# Patient Record
Sex: FEMALE | Race: WHITE | NOT HISPANIC OR LATINO | Employment: FULL TIME | URBAN - METROPOLITAN AREA
[De-identification: names, ages, dates, MRNs, and addresses within clinical notes are randomized per-mention and may not be internally consistent; named-entity substitution may affect disease eponyms.]

---

## 2019-01-24 ENCOUNTER — OFFICE VISIT (OUTPATIENT)
Dept: NEUROSURGERY | Facility: CLINIC | Age: 44
End: 2019-01-24

## 2019-01-24 VITALS
DIASTOLIC BLOOD PRESSURE: 82 MMHG | SYSTOLIC BLOOD PRESSURE: 160 MMHG | WEIGHT: 184.6 LBS | HEIGHT: 69 IN | TEMPERATURE: 98.2 F | BODY MASS INDEX: 27.34 KG/M2

## 2019-01-24 DIAGNOSIS — M50.30 DEGENERATIVE DISC DISEASE, CERVICAL: Primary | ICD-10-CM

## 2019-01-24 PROCEDURE — 99243 OFF/OP CNSLTJ NEW/EST LOW 30: CPT | Performed by: NEUROLOGICAL SURGERY

## 2019-01-24 RX ORDER — NABUMETONE 750 MG/1
750 TABLET, FILM COATED ORAL 2 TIMES DAILY
Qty: 60 TABLET | Refills: 0 | Status: SHIPPED | OUTPATIENT
Start: 2019-01-24 | End: 2019-01-24

## 2019-01-24 RX ORDER — CYCLOBENZAPRINE HCL 10 MG
10 TABLET ORAL 3 TIMES DAILY PRN
COMMUNITY
End: 2020-02-04

## 2019-01-24 RX ORDER — NABUMETONE 750 MG/1
750 TABLET, FILM COATED ORAL 2 TIMES DAILY
Qty: 60 TABLET | Refills: 0 | Status: SHIPPED | OUTPATIENT
Start: 2019-01-24 | End: 2020-02-04

## 2019-01-24 RX ORDER — IBUPROFEN 800 MG/1
800 TABLET ORAL EVERY 6 HOURS PRN
COMMUNITY
End: 2020-08-14 | Stop reason: HOSPADM

## 2019-01-24 NOTE — PROGRESS NOTES
Yuni Cowan  1975  [id number]      Chief Complaint   Patient presents with   • Neck Pain   • Arm Pain       HISTORY OF PRESENT ILLNESS:  This is a 43-year-old seen with a several month progressive history of pain in the cervical area rating into the right shoulder and right upper extremity.  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  She has been to physical therapy with only minimal improvement.  She is been on ibuprofen which has helped somewhat; meloxicam has not.  Cervical MRIs been performed and she referred for neurosurgical consultation.    She has a genetic predisposition for degenerative disc disease.     Past Medical History:   Diagnosis Date   • Anemia    • Headache        Past Surgical History:   Procedure Laterality Date   • TONSILLECTOMY     • TUBAL ABDOMINAL LIGATION     • WISDOM TOOTH EXTRACTION         Family History   Problem Relation Age of Onset   • Hypertension Father    • Diabetes Son    • Kidney disease Maternal Grandfather        Social History     Socioeconomic History   • Marital status:      Spouse name: Not on file   • Number of children: Not on file   • Years of education: Not on file   • Highest education level: Not on file   Social Needs   • Financial resource strain: Not on file   • Food insecurity - worry: Not on file   • Food insecurity - inability: Not on file   • Transportation needs - medical: Not on file   • Transportation needs - non-medical: Not on file   Occupational History   • Not on file   Tobacco Use   • Smoking status: Never Smoker   • Smokeless tobacco: Never Used   Substance and Sexual Activity   • Alcohol use: No     Frequency: Never   • Drug use: No   • Sexual activity: Defer   Other Topics Concern   • Not on file   Social History Narrative   • Not on file       No Known Allergies      Current Outpatient Medications:   •  cyclobenzaprine (FLEXERIL) 10 MG tablet, Take 10 mg by mouth 3 (Three) Times a Day As Needed for Muscle  Spasms., Disp: , Rfl:   •  ibuprofen (ADVIL,MOTRIN) 800 MG tablet, Take 800 mg by mouth Every 6 (Six) Hours As Needed for Mild Pain ., Disp: , Rfl:     Review of Systems   Constitutional: Positive for activity change and fatigue. Negative for appetite change, chills, diaphoresis, fever and unexpected weight change.   HENT: Negative for congestion, dental problem, drooling, ear discharge, ear pain, facial swelling, hearing loss, mouth sores, nosebleeds, postnasal drip, rhinorrhea, sinus pressure, sneezing, sore throat, tinnitus, trouble swallowing and voice change.    Eyes: Negative for photophobia, pain, discharge, redness, itching and visual disturbance.   Respiratory: Positive for apnea and chest tightness. Negative for cough, choking, shortness of breath, wheezing and stridor.    Cardiovascular: Positive for chest pain. Negative for palpitations and leg swelling.   Gastrointestinal: Negative for abdominal distention, abdominal pain, anal bleeding, blood in stool, constipation, diarrhea, nausea, rectal pain and vomiting.   Endocrine: Negative for cold intolerance, heat intolerance, polydipsia, polyphagia and polyuria.   Genitourinary: Positive for frequency and pelvic pain. Negative for decreased urine volume, difficulty urinating, dysuria, enuresis, flank pain, genital sores, hematuria and urgency.   Musculoskeletal: Positive for arthralgias, back pain, myalgias, neck pain and neck stiffness. Negative for gait problem and joint swelling.   Skin: Negative for color change, pallor, rash and wound.   Allergic/Immunologic: Negative for environmental allergies, food allergies and immunocompromised state.   Neurological: Positive for weakness, light-headedness, numbness and headaches. Negative for dizziness, tremors, seizures, syncope, facial asymmetry and speech difficulty.   Hematological: Negative for adenopathy. Bruises/bleeds easily.   Psychiatric/Behavioral: Positive for agitation, confusion, decreased  concentration, dysphoric mood and sleep disturbance. Negative for behavioral problems, hallucinations, self-injury and suicidal ideas. The patient is nervous/anxious. The patient is not hyperactive.        There were no vitals filed for this visit.    Neurological Examination:    Mental status/speech: The patient is alert and oriented.  Speech is clear without aphysia or dysarthria.  No overt cognitive deficits.    Cranial nerve examination:    Olfaction: Smell is intact.  Vision: Vision is intact without visual field abnormalities.  Funduscopic examination is normal.  No pupillary irregularity.  Ocular motor examination: The extraocular muscles are intact.  There is no diplopia.  The pupil is round and reactive to both light and accommodation.  There is no nystagmus.  Facial movement/sensation: There is no facial weakness.  Sensation is intact in the first, second, and third divisions of the trigeminal nerve.  The corneal reflex is intact.  Auditory: Hearing is intact to finger rub bilaterally.  Cranial nerves IX, X, XI, XII: Phonation is normal.  No dysphagia.  Tongue is protruded in the midline without atrophy.  The gag reflex is intact.  Shoulder shrug is normal.    Musculoligamentous ligamentous examination: Tatian range of motion of her neck toward the right.  She had generalized weakness in her right upper extremity at the deep tendon reflexes are elicitable.  There is no Babinski Marva or clonus.  Her gait is normal.    Medical Decision Making:     Diagnostic Data Set:  The cervical MRI showed degenerative disc disease throughout the cervical spine.  At C5-C6 and C6-C7 she has bulge of intravertebral disc.      Assessment:  Symptomatic cervical spondylosis          Recommendations:  I've send her for a ergonomic evaluation by physical therapy; have given her prescription for Relafen 750 mg twice a day we'll ask her to call me in 2 weeks.  I'm hopeful that with correction of her workstation and perhaps  cervical traction she will show some improvement.  If not she would need additional diagnostic studies to include EMG/NCV and possible myelography.  I would hope, however, we can manage this conservatively.  I will continue to follow her and keep you informed.         I greatly appreciate the opportunity to see and evaluate this individual.  If you have questions or concerns regarding issues that I may have overlooked please call me at any time: [phone].  Alcides Van M.D.  [facility]  [address] [place] [address]    Scribed for Oumar Van MD by Lakeshia Garduno CMA. 1/24/2019  9:28 AM     I have read and concur with the information provided by the scribe.  Oumar Van MD

## 2019-01-24 NOTE — PATIENT INSTRUCTIONS
Call Dr. Van on a Monday or Tuesday with an update.    Ask for Barbara () and leave a message for  Dr. Van.   He will call you back at the end of the day as soon as he can.       Call 2 weeks      [phone]

## 2019-09-23 ENCOUNTER — OFFICE VISIT (OUTPATIENT)
Dept: NEUROSURGERY | Facility: CLINIC | Age: 44
End: 2019-09-23

## 2019-09-23 VITALS — HEIGHT: 69 IN | BODY MASS INDEX: 27.11 KG/M2 | TEMPERATURE: 97.6 F | WEIGHT: 183 LBS

## 2019-09-23 DIAGNOSIS — M50.30 DEGENERATIVE DISC DISEASE, CERVICAL: ICD-10-CM

## 2019-09-23 DIAGNOSIS — R29.898 RIGHT HAND WEAKNESS: ICD-10-CM

## 2019-09-23 DIAGNOSIS — R29.898 RIGHT ARM WEAKNESS: ICD-10-CM

## 2019-09-23 DIAGNOSIS — M50.20 HNP (HERNIATED NUCLEUS PULPOSUS), CERVICAL: Primary | ICD-10-CM

## 2019-09-23 PROBLEM — M54.12 RADICULOPATHY OF CERVICAL SPINE: Status: ACTIVE | Noted: 2019-09-23

## 2019-09-23 PROCEDURE — 99215 OFFICE O/P EST HI 40 MIN: CPT | Performed by: PHYSICIAN ASSISTANT

## 2019-10-07 DIAGNOSIS — M50.20 HNP (HERNIATED NUCLEUS PULPOSUS), CERVICAL: ICD-10-CM

## 2019-10-07 DIAGNOSIS — R29.898 RIGHT ARM WEAKNESS: ICD-10-CM

## 2019-10-07 DIAGNOSIS — M50.30 DEGENERATIVE DISC DISEASE, CERVICAL: Primary | ICD-10-CM

## 2019-10-08 PROBLEM — M50.30 DEGENERATIVE DISC DISEASE, CERVICAL: Status: ACTIVE | Noted: 2019-10-08

## 2019-10-09 ENCOUNTER — HOSPITAL ENCOUNTER (OUTPATIENT)
Facility: HOSPITAL | Age: 44
Setting detail: HOSPITAL OUTPATIENT SURGERY
Discharge: HOME | End: 2019-10-09
Attending: RADIOLOGY | Admitting: NEUROLOGICAL SURGERY

## 2019-10-09 ENCOUNTER — HOSPITAL ENCOUNTER (OUTPATIENT)
Dept: GENERAL RADIOLOGY | Facility: HOSPITAL | Age: 44
End: 2019-10-09

## 2019-10-09 ENCOUNTER — APPOINTMENT (OUTPATIENT)
Dept: CT IMAGING | Facility: HOSPITAL | Age: 44
End: 2019-10-09

## 2019-10-09 ENCOUNTER — HOSPITAL ENCOUNTER (OUTPATIENT)
Dept: NEUROLOGY | Facility: HOSPITAL | Age: 44
Discharge: HOME | End: 2019-10-09

## 2019-10-09 ENCOUNTER — APPOINTMENT (OUTPATIENT)
Dept: NEUROLOGY | Facility: HOSPITAL | Age: 44
End: 2019-10-09

## 2019-10-09 VITALS
WEIGHT: 186.73 LBS | TEMPERATURE: 98.5 F | OXYGEN SATURATION: 99 % | DIASTOLIC BLOOD PRESSURE: 88 MMHG | RESPIRATION RATE: 20 BRPM | HEIGHT: 69 IN | SYSTOLIC BLOOD PRESSURE: 125 MMHG | HEART RATE: 66 BPM | BODY MASS INDEX: 27.66 KG/M2

## 2019-10-09 DIAGNOSIS — M50.30 DEGENERATIVE DISC DISEASE, CERVICAL: ICD-10-CM

## 2019-10-09 DIAGNOSIS — M50.20 HNP (HERNIATED NUCLEUS PULPOSUS), CERVICAL: ICD-10-CM

## 2019-10-09 DIAGNOSIS — R29.898 RIGHT ARM WEAKNESS: ICD-10-CM

## 2019-10-09 LAB — B-HCG UR QL: NEGATIVE

## 2019-10-09 PROCEDURE — 61055 INJECTION INTO BRAIN CANAL: CPT | Performed by: RADIOLOGY

## 2019-10-09 PROCEDURE — 95886 MUSC TEST DONE W/N TEST COMP: CPT

## 2019-10-09 PROCEDURE — 72240 MYELOGRAPHY NECK SPINE: CPT | Performed by: RADIOLOGY

## 2019-10-09 PROCEDURE — 95908 NRV CNDJ TST 3-4 STUDIES: CPT

## 2019-10-09 PROCEDURE — 25010000002 IOPAMIDOL 61 % SOLUTION: Performed by: RADIOLOGY

## 2019-10-09 PROCEDURE — 81025 URINE PREGNANCY TEST: CPT | Performed by: RADIOLOGY

## 2019-10-09 PROCEDURE — 72126 CT NECK SPINE W/DYE: CPT

## 2019-10-09 RX ORDER — LIDOCAINE HYDROCHLORIDE 10 MG/ML
INJECTION, SOLUTION EPIDURAL; INFILTRATION; INTRACAUDAL; PERINEURAL AS NEEDED
Status: DISCONTINUED | OUTPATIENT
Start: 2019-10-09 | End: 2019-10-09 | Stop reason: HOSPADM

## 2019-10-09 NOTE — DISCHARGE INSTRUCTIONS
1.) DRINK PLENTY OF FLUIDS ( WATER) TODAY.  2.) NO STRENUOUS ACTIVITY,  DO NOT LIFT ANYTHING OVER 20 LBS FOR 24 HOURS.

## 2019-10-17 ENCOUNTER — OFFICE VISIT (OUTPATIENT)
Dept: NEUROSURGERY | Facility: CLINIC | Age: 44
End: 2019-10-17

## 2019-10-17 VITALS
WEIGHT: 187 LBS | TEMPERATURE: 97 F | SYSTOLIC BLOOD PRESSURE: 170 MMHG | OXYGEN SATURATION: 98 % | HEART RATE: 66 BPM | HEIGHT: 69 IN | BODY MASS INDEX: 27.7 KG/M2 | RESPIRATION RATE: 20 BRPM | DIASTOLIC BLOOD PRESSURE: 95 MMHG

## 2019-10-17 DIAGNOSIS — M50.20 HNP (HERNIATED NUCLEUS PULPOSUS), CERVICAL: ICD-10-CM

## 2019-10-17 DIAGNOSIS — R29.898 RIGHT ARM WEAKNESS: ICD-10-CM

## 2019-10-17 DIAGNOSIS — M50.30 DEGENERATIVE DISC DISEASE, CERVICAL: Primary | ICD-10-CM

## 2019-10-17 PROCEDURE — 99213 OFFICE O/P EST LOW 20 MIN: CPT | Performed by: NEUROLOGICAL SURGERY

## 2019-10-17 NOTE — PATIENT INSTRUCTIONS
Call Dr. Van on a Monday or Tuesday with an update.      Ask for Barbara () and leave a message for  Dr. Van.     He will call you back at the end of the day as soon as he can.     [phone]

## 2019-10-17 NOTE — PROGRESS NOTES
"Yuni Cowan  1975  [id number]                        CHIEF COMPLAINT: Right shoulder pain         MEDICAL HISTORY SINCE LAST ENCOUNTER: This 44-year-old had a cervical myelogram showing the presence of cervical spondylosis at C5-C6 greater on the right than the left.  Initially she has severe pain rating to right upper extremity however with therapy and ergonomic arrangement of office is better although not asymptomatic.  She still has pain in her right shoulder that radiates suboccipital to the temporal region.  She on occasion \"drops things\".  Nevertheless she does not have the same radicular symptoms she had previously.  Therefore although she has shown some improvement she remains minimally symptomatic.           Past Medical History:   Diagnosis Date   • Anemia    • Headache               Past Surgical History:   Procedure Laterality Date   • [place] MYELOGRAPHY VIA LUMBAR INJECTION RS&I CERVICAL N/A 10/9/2019    Procedure: IR myelogram, cervical;  Surgeon: Jose Amado MD;  Location: [facility] INVASIVE LOCATION;  Service: Interventional Radiology   • TONSILLECTOMY     • TUBAL ABDOMINAL LIGATION     • WISDOM TOOTH EXTRACTION                Family History   Problem Relation Age of Onset   • Hypertension Father    • Diabetes Son    • Kidney disease Maternal Grandfather               Social History     Socioeconomic History   • Marital status:      Spouse name: Not on file   • Number of children: Not on file   • Years of education: Not on file   • Highest education level: Not on file   Tobacco Use   • Smoking status: Never Smoker   • Smokeless tobacco: Never Used   Substance and Sexual Activity   • Alcohol use: No     Frequency: Never   • Drug use: No   • Sexual activity: Defer            No Known Allergies           Current Outpatient Medications:   •  cyclobenzaprine (FLEXERIL) 10 MG tablet, Take 10 mg by mouth 3 (Three) Times a Day As Needed for Muscle Spasms., Disp: , Rfl:   •  ibuprofen " "(ADVIL,MOTRIN) 800 MG tablet, Take 800 mg by mouth Every 6 (Six) Hours As Needed for Mild Pain ., Disp: , Rfl:   •  nabumetone (RELAFEN) 750 MG tablet, Take 1 tablet by mouth 2 (Two) Times a Day., Disp: 60 tablet, Rfl: 0         Review of Systems   Musculoskeletal: Positive for arthralgias, myalgias, neck pain and neck stiffness.   Neurological: Positive for weakness, numbness and headaches.   All other systems reviewed and are negative.              Vitals:    10/17/19 1448   BP: 170/95   Pulse: 66   Resp: 20   Temp: 97 °F (36.1 °C)   SpO2: 98%   Weight: 84.8 kg (187 lb)   Height: 175.3 cm (69\")               EXAMINATION: Slight decreased range of motion of the cervical spine.  There is no weakness sensory loss or reflex asymmetry at this time.            MEDICAL DECISION MAKING: She has improved to the point that I am not certain that anterior cervical decompression with fusion at C5-C6 is going to alleviate the symptoms that she currently manifests.           ASSESSMENT/DISPOSITION: I have asked her to consider pain management in reference to a cortisone injection either in the shoulder or facet block at C5-C6.  She will call me afterward.  My concern is the outcome of any planned surgery for disc excision with the symptoms that she has at this time.  The symptoms clearly are indicative of degenerative change however are nonradicular.              I APPRECIATE THE OPPORTUNITY OF THIS REFERRAL. PLEASE CALL IF ANY       QUESTIONS [phone]  Scribed for Oumar Van MD by Marla Jones CMA. 10/17/2019  3:00 PM  I have read and concur with the information provided by the scribe.  Oumar Van MD    "

## 2020-01-30 ENCOUNTER — PREP FOR SURGERY (OUTPATIENT)
Dept: OTHER | Facility: HOSPITAL | Age: 45
End: 2020-01-30

## 2020-01-30 DIAGNOSIS — M54.12 CERVICAL RADICULITIS: Primary | ICD-10-CM

## 2020-01-30 NOTE — H&P
[id number]  Yuni Cowan  female  1975  Aman Chatterjee, DO  1/30/2020  PATIENT NAME: Yuni Cowan      BP: ()/()   Arterial Line BP: ()/()     Past Medical History:   Diagnosis Date   • Anemia    • Headache        Past Surgical History:   Procedure Laterality Date   • [place] MYELOGRAPHY VIA LUMBAR INJECTION RS&I CERVICAL N/A 10/9/2019    Procedure: IR myelogram, cervical;  Surgeon: Jose Amado MD;  Location: [facility] INVASIVE LOCATION;  Service: Interventional Radiology   • TONSILLECTOMY     • TUBAL ABDOMINAL LIGATION     • WISDOM TOOTH EXTRACTION         Social History     Socioeconomic History   • Marital status:      Spouse name: Not on file   • Number of children: Not on file   • Years of education: Not on file   • Highest education level: Not on file   Tobacco Use   • Smoking status: Never Smoker   • Smokeless tobacco: Never Used   Substance and Sexual Activity   • Alcohol use: No     Frequency: Never   • Drug use: No   • Sexual activity: Defer       Family History   Problem Relation Age of Onset   • Hypertension Father    • Diabetes Son    • Kidney disease Maternal Grandfather          (Not in a hospital admission)      Review of Systems/Physical Exam:   Within Normal Limits Abnormal   Mental Status [x]    []     Head, Neck, and Throat [x]   []     Chest/Lungs [x]   []     Cardiovascular [x]   []     Abdomen [x]   []     Extremities [x]   []     Neurologic [x]   []     Other         There were no vitals filed for this visit.    Review of Systems - as previously noted      Diagnosis: M54.12    Plan: CERVICAL EPIDURAL (N/A)       STATEMENT AS TO REASON OF PLANNED OPERATION:  [x]   H&P revealed no contraindication to planned surgery. (Check if correct).    [x]   Labs (if ordered) have been reviewed and revealed no contraindications to planned surgery. (Check if correct).      Aman Chatterjee, DO  1/30/2020

## 2020-02-05 ENCOUNTER — HOSPITAL ENCOUNTER (OUTPATIENT)
Facility: HOSPITAL | Age: 45
Setting detail: HOSPITAL OUTPATIENT SURGERY
Discharge: HOME | End: 2020-02-05
Attending: ANESTHESIOLOGY | Admitting: ANESTHESIOLOGY

## 2020-02-05 ENCOUNTER — APPOINTMENT (OUTPATIENT)
Dept: GENERAL RADIOLOGY | Facility: HOSPITAL | Age: 45
End: 2020-02-05

## 2020-02-05 VITALS
SYSTOLIC BLOOD PRESSURE: 155 MMHG | HEIGHT: 69 IN | TEMPERATURE: 98.2 F | RESPIRATION RATE: 20 BRPM | BODY MASS INDEX: 27.4 KG/M2 | HEART RATE: 66 BPM | OXYGEN SATURATION: 100 % | DIASTOLIC BLOOD PRESSURE: 84 MMHG | WEIGHT: 185 LBS

## 2020-02-05 LAB
B-HCG UR QL: NEGATIVE
INTERNAL NEGATIVE CONTROL: NEGATIVE
INTERNAL POSITIVE CONTROL: POSITIVE
Lab: NORMAL

## 2020-02-05 PROCEDURE — 0 IOPAMIDOL 41 % SOLUTION: Performed by: ANESTHESIOLOGY

## 2020-02-05 PROCEDURE — 25010000002 DEXAMETHASONE PER 1 MG: Performed by: ANESTHESIOLOGY

## 2020-02-05 PROCEDURE — 81025 URINE PREGNANCY TEST: CPT | Performed by: ANESTHESIOLOGY

## 2020-02-05 PROCEDURE — 76000 FLUOROSCOPY <1 HR PHYS/QHP: CPT | Performed by: RADIOLOGY

## 2020-02-05 PROCEDURE — 76000 FLUOROSCOPY <1 HR PHYS/QHP: CPT

## 2020-02-05 RX ORDER — LIDOCAINE HYDROCHLORIDE 20 MG/ML
INJECTION, SOLUTION INFILTRATION; PERINEURAL AS NEEDED
Status: DISCONTINUED | OUTPATIENT
Start: 2020-02-05 | End: 2020-02-05 | Stop reason: HOSPADM

## 2020-02-05 RX ORDER — DEXAMETHASONE SODIUM PHOSPHATE 10 MG/ML
INJECTION INTRAMUSCULAR; INTRAVENOUS AS NEEDED
Status: DISCONTINUED | OUTPATIENT
Start: 2020-02-05 | End: 2020-02-05 | Stop reason: HOSPADM

## 2020-02-05 NOTE — OP NOTE
Informed consent was obtained after a discussion of risks(hemodynamic instability, respiratory compromise, nerve damage, etc.) and benefits of the procedure, as well as the treatment alternatives, including continued conservative therapy.    The patient was brought into the fluoroscopy suite and positioned in the prone position on the procedure table with the head supported by a pillow.  Blood pressure, heart rate, and pulse oximetry were monitored throughout the case. Skin overlying the cervical region was cleansed with both ethyl alcohol and chlorahexidine solution. The area was then draped with sterile towel. Skin overlying the area was then infiltrated with 3cc of 1% Lidocaine.  Following this a 18 gauge Touhy needle was advanced into the C6/7 intralaminar space.  The epidural space was identified using a loss of resistance technique. Oblique view was also done to visualize needle depth. Aspirations were negative for blood or CSF, and no parasthesia were elicited.    Contrast was then injected with appropriate epidural spread.  Total volume of 2 mL (Omnipaque 240 mg/mL).   Next, an epidural steroid injection was performed with 15 mg of dexamethasone (10mg/mL) diluted in 2mL of normal saline for a total volume of 3.5mL. The needle was removed.    The patient was monitored for any adverse hemodynamic, allergic, or neurological symptoms. The patient tolerated the procedure well with no apparent complications. Vital signs remained stable throughout the procedure. The patient was taken to the recovery area where written discharge instructions for the procedure were given. The patient was discharged home.    NOTE: Universal Sterile Protocol was observed throughout the entire procedure.    MINDI 6/7    Cpt 36067    Aman Chatterjee DO  02/05/20

## 2020-05-26 ENCOUNTER — TELEPHONE (OUTPATIENT)
Dept: NEUROSURGERY | Facility: CLINIC | Age: 45
End: 2020-05-26

## 2020-05-26 NOTE — TELEPHONE ENCOUNTER
Pt left a mess lisset stating she has completed 2 steroid injections. The 2nd one has caused her more issues and pain than she had prior. Pt c/o of pain in her entire right arm, and is now having issues with hand movement. Pt wants to know if she should come back and see Dr. Van or continue with injections?

## 2020-06-01 ENCOUNTER — OFFICE VISIT (OUTPATIENT)
Dept: NEUROSURGERY | Facility: CLINIC | Age: 45
End: 2020-06-01

## 2020-06-01 VITALS — HEIGHT: 69 IN | WEIGHT: 189 LBS | BODY MASS INDEX: 27.99 KG/M2

## 2020-06-01 DIAGNOSIS — R29.898 RIGHT ARM WEAKNESS: Primary | ICD-10-CM

## 2020-06-01 DIAGNOSIS — M50.30 DEGENERATIVE DISC DISEASE, CERVICAL: ICD-10-CM

## 2020-06-01 PROCEDURE — 99213 OFFICE O/P EST LOW 20 MIN: CPT | Performed by: NEUROLOGICAL SURGERY

## 2020-06-01 NOTE — PROGRESS NOTES
Yuni Cowan  1975  [id number]                        CHIEF COMPLAINT:  [Cervical, right arm pain]         MEDICAL HISTORY SINCE LAST ENCOUNTER:  [This is a 44-year-old female who has a 3-year history of pain in the cervical area which initially radiated into the left upper extremity.  Diagnostic studies showed the presence of cervical spondylosis C5-6 and C6-7.  This was treated conservatively with physical therapy and pain management.  Steroid injection actually was quite helpful in alleviating the symptoms in her left upper extremity however she continues to have severe pain in her neck, intrascapular region rating into the right upper extremity.    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  However her workstation is ergonomically correct.  She has no lumbar issues.]           Past Medical History:   Diagnosis Date   • Anemia    • Arthritis    • Headache               Past Surgical History:   Procedure Laterality Date   • CERVICAL EPIDURAL N/A 2/5/2020    Procedure: CERVICAL EPIDURAL;  Surgeon: Aman Chatterjee DO;  Location: [facility];  Service: Pain Management;  Laterality: N/A;   • ENDOSCOPY     • [place] MYELOGRAPHY VIA LUMBAR INJECTION RS&I CERVICAL N/A 10/9/2019    Procedure: IR myelogram, cervical;  Surgeon: Jose Amado MD;  Location: [facility] INVASIVE LOCATION;  Service: Interventional Radiology   • TONSILLECTOMY     • TUBAL ABDOMINAL LIGATION     • WISDOM TOOTH EXTRACTION                Family History   Problem Relation Age of Onset   • Hypertension Father    • Diabetes Son    • Kidney disease Maternal Grandfather               Social History     Socioeconomic History   • Marital status:      Spouse name: Not on file   • Number of children: Not on file   • Years of education: Not on file   • Highest education level: Not on file   Tobacco Use   • Smoking status: Never Smoker   • Smokeless tobacco: Never Used   Substance  and Sexual Activity   • Alcohol use: No     Frequency: Never   • Drug use: No   • Sexual activity: Defer            No Known Allergies           Current Outpatient Medications:   •  ibuprofen (ADVIL,MOTRIN) 800 MG tablet, Take 800 mg by mouth Every 6 (Six) Hours As Needed for Mild Pain ., Disp: , Rfl:          Review of Systems   Constitutional: Positive for activity change, chills and fatigue. Negative for appetite change, diaphoresis, fever and unexpected weight change.   HENT: Positive for tinnitus. Negative for congestion, dental problem, drooling, ear discharge, ear pain, facial swelling, hearing loss, mouth sores, nosebleeds, postnasal drip, rhinorrhea, sinus pressure, sinus pain, sneezing, sore throat, trouble swallowing and voice change.    Eyes: Positive for photophobia. Negative for pain, discharge, redness, itching and visual disturbance.   Respiratory: Positive for chest tightness and shortness of breath. Negative for apnea, cough, choking, wheezing and stridor.    Cardiovascular: Positive for chest pain. Negative for palpitations and leg swelling.   Gastrointestinal: Negative for abdominal distention, abdominal pain, anal bleeding, blood in stool, constipation, diarrhea, nausea, rectal pain and vomiting.   Endocrine: Negative for cold intolerance, heat intolerance, polydipsia, polyphagia and polyuria.   Genitourinary: Negative for decreased urine volume, difficulty urinating, dyspareunia, dysuria, enuresis, flank pain, frequency, genital sores, hematuria, menstrual problem, pelvic pain, urgency, vaginal bleeding, vaginal discharge and vaginal pain.   Musculoskeletal: Positive for arthralgias, myalgias, neck pain and neck stiffness. Negative for back pain, gait problem and joint swelling.   Skin: Negative for color change, pallor, rash and wound.   Allergic/Immunologic: Negative for environmental allergies, food allergies and immunocompromised state.   Neurological: Positive for weakness,  "light-headedness, numbness and headaches. Negative for dizziness, tremors, seizures, syncope, facial asymmetry and speech difficulty.   Hematological: Negative for adenopathy. Does not bruise/bleed easily.   Psychiatric/Behavioral: Positive for agitation and sleep disturbance. Negative for behavioral problems, confusion, decreased concentration, dysphoric mood, hallucinations, self-injury and suicidal ideas. The patient is not nervous/anxious and is not hyperactive.                Vitals:    06/01/20 1413   Weight: 85.7 kg (189 lb)   Height: 175.3 cm (69\")               EXAMINATION: She has limitation to range of motion of cervical spine.  Her strength is intact without weakness or sensory loss.  No Babinski Marva or clonus.            MEDICAL DECISION MAKING: Cervical spondylosis C5-6, C6-7.      Her symptoms have progressed and have been relatively refractory therefore I think a decision needs to be made as to whether surgery is a consideration or her only option is that of a conservative-ism.  Her diagnostic studies in the past have shown degenerative changes however surgery was not contemplated at that time.           ASSESSMENT/DISPOSITION: 'Should this show significant cervical disc disease surgery will be necessitated.  I have scheduled cervical MRI; EMG and NCV and will see her subsequently.              I APPRECIATE THE OPPORTUNITY OF THIS REFERRAL. PLEASE CALL IF ANY       QUESTIONS [phone]  Scribed for Oumar Van MD by Lakeshia Garduno CMA. 6/1/2020 14:44  "

## 2020-07-20 ENCOUNTER — HOSPITAL ENCOUNTER (OUTPATIENT)
Dept: NEUROLOGY | Facility: HOSPITAL | Age: 45
Discharge: HOME | End: 2020-07-20
Admitting: NEUROLOGICAL SURGERY

## 2020-07-20 ENCOUNTER — HOSPITAL ENCOUNTER (OUTPATIENT)
Dept: MRI IMAGING | Facility: HOSPITAL | Age: 45
Discharge: HOME | End: 2020-07-20

## 2020-07-20 ENCOUNTER — OFFICE VISIT (OUTPATIENT)
Dept: NEUROSURGERY | Facility: CLINIC | Age: 45
End: 2020-07-20

## 2020-07-20 VITALS — HEIGHT: 69 IN | WEIGHT: 191 LBS | BODY MASS INDEX: 28.29 KG/M2 | TEMPERATURE: 97.8 F

## 2020-07-20 DIAGNOSIS — R29.898 RIGHT ARM WEAKNESS: ICD-10-CM

## 2020-07-20 DIAGNOSIS — M50.20 HERNIATED CERVICAL DISC: Primary | ICD-10-CM

## 2020-07-20 DIAGNOSIS — M50.30 DEGENERATIVE DISC DISEASE, CERVICAL: ICD-10-CM

## 2020-07-20 PROCEDURE — 95910 NRV CNDJ TEST 7-8 STUDIES: CPT

## 2020-07-20 PROCEDURE — 95886 MUSC TEST DONE W/N TEST COMP: CPT

## 2020-07-20 PROCEDURE — 99213 OFFICE O/P EST LOW 20 MIN: CPT | Performed by: NEUROLOGICAL SURGERY

## 2020-07-20 PROCEDURE — 72141 MRI NECK SPINE W/O DYE: CPT

## 2020-07-20 NOTE — PROGRESS NOTES
Yuni Cowan  1975  [id number]                        CHIEF COMPLAINT: Cervical and arm pain         MEDICAL HISTORY SINCE LAST ENCOUNTER: This is a 45-year-old female who is failed all conservative-ism and presents with recent studies for further evaluation of cervical radiculopathy.  And the EMG/NCV and cervical MRIs been performed.           Past Medical History:   Diagnosis Date   • Anemia    • Arthritis    • Headache               Past Surgical History:   Procedure Laterality Date   • CERVICAL EPIDURAL N/A 2/5/2020    Procedure: CERVICAL EPIDURAL;  Surgeon: Aman Chatterjee DO;  Location: [facility] OR;  Service: Pain Management;  Laterality: N/A;   • ENDOSCOPY     • CA MYELOGRAPHY VIA LUMBAR INJECTION RS&I CERVICAL N/A 10/9/2019    Procedure: IR myelogram, cervical;  Surgeon: Jose Amado MD;  Location: [facility] INVASIVE LOCATION;  Service: Interventional Radiology   • TONSILLECTOMY     • TUBAL ABDOMINAL LIGATION     • WISDOM TOOTH EXTRACTION                Family History   Problem Relation Age of Onset   • Hypertension Father    • Diabetes Son    • Kidney disease Maternal Grandfather               Social History     Socioeconomic History   • Marital status:      Spouse name: Not on file   • Number of children: Not on file   • Years of education: Not on file   • Highest education level: Not on file   Tobacco Use   • Smoking status: Never Smoker   • Smokeless tobacco: Never Used   Substance and Sexual Activity   • Alcohol use: No     Frequency: Never   • Drug use: No   • Sexual activity: Defer            No Known Allergies           Current Outpatient Medications:   •  ibuprofen (ADVIL,MOTRIN) 800 MG tablet, Take 800 mg by mouth Every 6 (Six) Hours As Needed for Mild Pain ., Disp: , Rfl:          Review of Systems   Constitutional: Positive for activity change, chills and fatigue. Negative for appetite change, diaphoresis, fever and unexpected weight change.   HENT: Positive for tinnitus.  Negative for congestion, dental problem, drooling, ear discharge, ear pain, facial swelling, hearing loss, mouth sores, nosebleeds, postnasal drip, rhinorrhea, sinus pressure, sinus pain, sneezing, sore throat, trouble swallowing and voice change.    Eyes: Positive for photophobia. Negative for pain, discharge, redness, itching and visual disturbance.   Respiratory: Positive for chest tightness and shortness of breath. Negative for apnea, cough, choking, wheezing and stridor.    Cardiovascular: Positive for chest pain. Negative for palpitations and leg swelling.   Gastrointestinal: Negative for abdominal distention, abdominal pain, anal bleeding, blood in stool, constipation, diarrhea, nausea, rectal pain and vomiting.   Endocrine: Negative for cold intolerance, heat intolerance, polydipsia, polyphagia and polyuria.   Genitourinary: Negative for decreased urine volume, difficulty urinating, dyspareunia, dysuria, enuresis, flank pain, frequency, genital sores, hematuria, menstrual problem, pelvic pain, urgency, vaginal bleeding, vaginal discharge and vaginal pain.   Musculoskeletal: Positive for arthralgias, myalgias, neck pain and neck stiffness. Negative for back pain, gait problem and joint swelling.   Skin: Negative for color change, pallor, rash and wound.   Allergic/Immunologic: Negative for environmental allergies, food allergies and immunocompromised state.   Neurological: Positive for weakness, light-headedness, numbness and headaches. Negative for dizziness, tremors, seizures, syncope, facial asymmetry and speech difficulty.   Hematological: Negative for adenopathy. Does not bruise/bleed easily.   Psychiatric/Behavioral: Positive for agitation and sleep disturbance. Negative for behavioral problems, confusion, decreased concentration, dysphoric mood, hallucinations, self-injury and suicidal ideas. The patient is not nervous/anxious and is not hyperactive.    All other systems reviewed and are  "negative.              Vitals:    07/20/20 1502   Temp: 97.8 °F (36.6 °C)   TempSrc: Infrared   Weight: 86.6 kg (191 lb)   Height: 175.3 cm (69\")               EXAMINATION: She has limited range of motion including flexion extension lateral rotation lateral bending.  The deep tendon reflexes are intact.  She has slight decreased sensation in the right upper extremity although ill-defined.          MEDICAL DECISION MAKING: NCS/EMG of the left arm suggests chronic C5 radiculopathy. NCS/EMG of the right arm suggests chronic C7 radiculopathy. No evidence for other peripheral nerve entrapment is seen in either arm.  Cervical MRI reveals At C5-6, there is a broad-based disc protrusion with focal left-sided  protrusion, and moderate canal stenosis, with slight flattening of the  cord and nearly complete effacement of the CSF. There is moderate  right-sided foraminal narrowing. The left neural foramen appears normal.     At C6-C7, there is relatively mild canal stenosis due to broad-based  disc protrusion, with slight flattening of the cord but fairly well  preserved CSF spaces anteriorly and posteriorly. Both foramina appear  moderately narrowed.           ASSESSMENT/DISPOSITION: She has failed conservative-ism and has anatomical/correlation with the cervical MRI.  It is particularly notable at C5-6.  C6-7 however shows a significant disc protrusion.  Therefore I think she could benefit from a two-level anterior cervical decompression and fusion C5-6; C6-7.  I am referring her to my associate Dr. Gu for surgical intervention.            I APPRECIATE THE OPPORTUNITY OF THIS REFERRAL. PLEASE CALL IF ANY       QUESTIONS [phone]        I have received verbal consent from the patient to receive care via telehealth.   "

## 2020-07-22 ENCOUNTER — OFFICE VISIT (OUTPATIENT)
Dept: NEUROSURGERY | Facility: CLINIC | Age: 45
End: 2020-07-22

## 2020-07-22 VITALS — HEIGHT: 69 IN | TEMPERATURE: 97.7 F | WEIGHT: 191 LBS | BODY MASS INDEX: 28.29 KG/M2

## 2020-07-22 DIAGNOSIS — M54.12 RADICULOPATHY OF CERVICAL SPINE: ICD-10-CM

## 2020-07-22 DIAGNOSIS — M47.812 CERVICAL SPONDYLOSIS WITHOUT MYELOPATHY: Primary | ICD-10-CM

## 2020-07-22 PROCEDURE — 99215 OFFICE O/P EST HI 40 MIN: CPT | Performed by: NEUROLOGICAL SURGERY

## 2020-07-22 RX ORDER — CHLORHEXIDINE GLUCONATE 4 G/100ML
SOLUTION TOPICAL
Qty: 120 ML | Refills: 0 | Status: ON HOLD | OUTPATIENT
Start: 2020-07-22 | End: 2020-08-13

## 2020-07-22 RX ORDER — SODIUM CHLORIDE 0.9 % (FLUSH) 0.9 %
3 SYRINGE (ML) INJECTION EVERY 12 HOURS SCHEDULED
Status: CANCELLED | OUTPATIENT
Start: 2020-07-22

## 2020-07-22 RX ORDER — ACETAMINOPHEN 325 MG/1
650 TABLET ORAL ONCE
Status: CANCELLED | OUTPATIENT
Start: 2020-07-22 | End: 2020-07-22

## 2020-07-22 RX ORDER — IBUPROFEN 200 MG
800 TABLET ORAL ONCE
Status: CANCELLED | OUTPATIENT
Start: 2020-07-22 | End: 2020-07-22

## 2020-07-22 RX ORDER — SODIUM CHLORIDE 0.9 % (FLUSH) 0.9 %
10 SYRINGE (ML) INJECTION AS NEEDED
Status: CANCELLED | OUTPATIENT
Start: 2020-07-22

## 2020-07-22 RX ORDER — HYDROCODONE BITARTRATE AND ACETAMINOPHEN 7.5; 325 MG/1; MG/1
1 TABLET ORAL ONCE
Status: CANCELLED | OUTPATIENT
Start: 2020-07-22 | End: 2020-07-22

## 2020-07-22 NOTE — PROGRESS NOTES
Subjective     Chief Complaint: Right-sided neck and arm pain    Patient ID: Yuni Cowan is a 45 y.o. female is here today as a transfer of care at the request of Alcides Van    Neck Pain    This is a chronic problem. The current episode started more than 1 year ago. The problem occurs constantly. The problem has been gradually worsening. The pain is associated with nothing. The pain is present in the right side. The quality of the pain is described as aching. The pain is at a severity of 7/10. The pain is moderate. The symptoms are aggravated by bending and position. The pain is same all the time. Stiffness is present in the morning. She has tried home exercises, NSAIDs, acetaminophen and oral narcotics for the symptoms. The treatment provided mild relief.       This is a 45-year-old woman who was referred to me by my partner, Dr. Van to be evaluated for an ACDF.  She endorses a greater than one-year history of progressive right-sided neck pain with radiating pain and numbness into her right shoulder and right hand.  She has tried physical therapy and interventional pain management without relief.  Her pain is affecting her ability to perform her job and her activities of daily living.  She is interested in proceeding with surgery.    She does not have much in the way of medical comorbidities.  She does not smoke or abuse alcohol.    The following portions of the patient's history were reviewed and updated as appropriate: allergies, current medications, past family history, past medical history, past social history, past surgical history and problem list.    Family history:   Family History   Problem Relation Age of Onset   • Hypertension Father    • Diabetes Son    • Kidney disease Maternal Grandfather        Social history:   Social History     Socioeconomic History   • Marital status:      Spouse name: Not on file   • Number of children: Not on file   • Years of education: Not on file   • Highest  "education level: Not on file   Tobacco Use   • Smoking status: Never Smoker   • Smokeless tobacco: Never Used   Substance and Sexual Activity   • Alcohol use: No     Frequency: Never   • Drug use: No   • Sexual activity: Defer       Review of Systems   Musculoskeletal: Positive for neck pain.   All other systems reviewed and are negative.      Objective   Temperature 97.7 °F (36.5 °C), temperature source Temporal, height 175.3 cm (69\"), weight 86.6 kg (191 lb).  Body mass index is 28.21 kg/m².    Physical Exam   Constitutional: She is oriented to person, place, and time. She appears well-developed.  Non-toxic appearance.   HENT:   Head: Normocephalic and atraumatic.   Right Ear: Hearing normal.   Left Ear: Hearing normal.   Nose: Nose normal.   Eyes: Pupils are equal, round, and reactive to light. Conjunctivae, EOM and lids are normal.   Neck: Normal range of motion. No JVD present.   Cardiovascular: Normal rate and regular rhythm.   Pulses:       Radial pulses are 2+ on the right side, and 2+ on the left side.   Pulmonary/Chest: Effort normal. No stridor. No respiratory distress. She has no wheezes.   Musculoskeletal:        Cervical back: She exhibits decreased range of motion, tenderness and pain. She exhibits no bony tenderness.   Neurological: She is alert and oriented to person, place, and time. She displays normal reflexes. No cranial nerve deficit. She exhibits normal muscle tone. Coordination and gait normal. GCS eye subscore is 4. GCS verbal subscore is 5. GCS motor subscore is 6.   Reflex Scores:       Tricep reflexes are 1+ on the right side and 1+ on the left side.       Bicep reflexes are 1+ on the right side and 1+ on the left side.       Brachioradialis reflexes are 1+ on the right side and 1+ on the left side.  Skin: Skin is warm and dry. No rash noted. No erythema.   Psychiatric: She has a normal mood and affect. Her behavior is normal. Judgment and thought content normal.   Nursing note and vitals " reviewed.        Assessment/Plan     Independent Review of Radiographic Studies:      Available for my review is an MRI of the cervical spine which was performed on 7/20/2020.  This demonstrates relatively focal degenerative disc disease at C5-6 and C6-7 with disc osteophyte complexes which cause severe right foraminal stenosis at C5-6 and moderate foraminal stenosis at C6-7 on the right.  These could reasonably be contributing to either a right C6 or right C7 radiculopathy.  The central canal is capacious and there is no evidence of cervical spinal cord compression.  Overall, lordosis is somewhat decreased.    Medical Decision Making:      This is a 45-year-old woman who has a greater than one-year history of right-sided neck pain.  She endorses pain radiating into what sounds like the C6 and C7 distributions in the right arm.  She has failed physical therapy and interventional pain management.  She is a candidate for a C5-7 ACDF.    Informed consent was obtained for an ACDF. She acknowledges a risk of spinal cord injury, paralysis, nerve root injury, pain, paralysis, loss of sensation, permanent neurological impairment, tracheal/esophageal injury, hoarseness, dysphaghia, vocal cord palsy, bleeding, infection, CSF leak, iatrogenic instability, exacerbation of adjacent level disease, pseudoarthrosis, instrumentation failure, failure of benefit of the procedure, or need for additional procedures. All questions were answered. No guarantees were given or implied. She elects to proceed.    I reviewed the pertinent anatomy with the aid of a 3-dimensional model of the spine.    Yuni was seen today for neck pain.    Diagnoses and all orders for this visit:    Cervical spondylosis without myelopathy    Radiculopathy of cervical spine        No follow-ups on file.           This document signed by BETTY Gu MD July 22, 2020 10:38

## 2020-08-10 ENCOUNTER — APPOINTMENT (OUTPATIENT)
Dept: PREADMISSION TESTING | Facility: HOSPITAL | Age: 45
End: 2020-08-10

## 2020-08-10 VITALS — WEIGHT: 191.14 LBS | HEIGHT: 69 IN | BODY MASS INDEX: 28.31 KG/M2

## 2020-08-10 DIAGNOSIS — M47.812 CERVICAL SPONDYLOSIS WITHOUT MYELOPATHY: ICD-10-CM

## 2020-08-10 DIAGNOSIS — M54.12 RADICULOPATHY OF CERVICAL SPINE: ICD-10-CM

## 2020-08-10 LAB
ANION GAP SERPL CALCULATED.3IONS-SCNC: 10 MMOL/L (ref 5–15)
BILIRUB UR QL STRIP: NEGATIVE
BUN SERPL-MCNC: 14 MG/DL (ref 6–20)
BUN/CREAT SERPL: 14.1 (ref 7–25)
CALCIUM SPEC-SCNC: 9.2 MG/DL (ref 8.6–10.5)
CHLORIDE SERPL-SCNC: 106 MMOL/L (ref 98–107)
CLARITY UR: CLEAR
CO2 SERPL-SCNC: 22 MMOL/L (ref 22–29)
COLOR UR: YELLOW
CREAT SERPL-MCNC: 0.99 MG/DL (ref 0.57–1)
DEPRECATED RDW RBC AUTO: 42.3 FL (ref 37–54)
ERYTHROCYTE [DISTWIDTH] IN BLOOD BY AUTOMATED COUNT: 13.1 % (ref 12.3–15.4)
GFR SERPL CREATININE-BSD FRML MDRD: 61 ML/MIN/1.73
GLUCOSE SERPL-MCNC: 112 MG/DL (ref 65–99)
GLUCOSE UR STRIP-MCNC: NEGATIVE MG/DL
HBA1C MFR BLD: 5.4 % (ref 4.8–5.6)
HCT VFR BLD AUTO: 36 % (ref 34–46.6)
HGB BLD-MCNC: 11 G/DL (ref 12–15.9)
HGB UR QL STRIP.AUTO: NEGATIVE
KETONES UR QL STRIP: NEGATIVE
LEUKOCYTE ESTERASE UR QL STRIP.AUTO: NEGATIVE
MCH RBC QN AUTO: 27 PG (ref 26.6–33)
MCHC RBC AUTO-ENTMCNC: 30.6 G/DL (ref 31.5–35.7)
MCV RBC AUTO: 88.2 FL (ref 79–97)
NITRITE UR QL STRIP: NEGATIVE
PH UR STRIP.AUTO: 7.5 [PH] (ref 5–8)
PLATELET # BLD AUTO: 258 10*3/MM3 (ref 140–450)
PMV BLD AUTO: 10.2 FL (ref 6–12)
POTASSIUM SERPL-SCNC: 4.2 MMOL/L (ref 3.5–5.2)
PROT UR QL STRIP: NEGATIVE
RBC # BLD AUTO: 4.08 10*6/MM3 (ref 3.77–5.28)
SODIUM SERPL-SCNC: 138 MMOL/L (ref 136–145)
SP GR UR STRIP: 1.01 (ref 1–1.03)
UROBILINOGEN UR QL STRIP: NORMAL
WBC # BLD AUTO: 6.07 10*3/MM3 (ref 3.4–10.8)

## 2020-08-10 PROCEDURE — 87081 CULTURE SCREEN ONLY: CPT | Performed by: NEUROLOGICAL SURGERY

## 2020-08-10 PROCEDURE — 81003 URINALYSIS AUTO W/O SCOPE: CPT | Performed by: NEUROLOGICAL SURGERY

## 2020-08-10 PROCEDURE — 83036 HEMOGLOBIN GLYCOSYLATED A1C: CPT | Performed by: NEUROLOGICAL SURGERY

## 2020-08-10 PROCEDURE — U0002 COVID-19 LAB TEST NON-CDC: HCPCS

## 2020-08-10 PROCEDURE — 85027 COMPLETE CBC AUTOMATED: CPT | Performed by: NEUROLOGICAL SURGERY

## 2020-08-10 PROCEDURE — C9803 HOPD COVID-19 SPEC COLLECT: HCPCS

## 2020-08-10 PROCEDURE — 36415 COLL VENOUS BLD VENIPUNCTURE: CPT

## 2020-08-10 PROCEDURE — U0004 COV-19 TEST NON-CDC HGH THRU: HCPCS

## 2020-08-10 PROCEDURE — 80048 BASIC METABOLIC PNL TOTAL CA: CPT | Performed by: NEUROLOGICAL SURGERY

## 2020-08-10 NOTE — PAT
Patient to apply Chlorhexadine wipes  to surgical area (as instructed) the night before procedure and the AM of procedure. Wipes provided.    Patient instructed to drink 20 ounces (or until full) of Gatorade and it needs to be completed 1 hour before given arrival time for procedure (NO RED Gatorade)    Patient verbalized understanding.   Chlorhexidine Prescription given during PAT visit, as well as written and verbal instructions given to patient during PAT visit.  Patient/family also instructed to complete skin prep checklist and return the checklist on the day of surgery to preoperative staff.  Patient/family verbalized understanding.

## 2020-08-11 LAB
MRSA SPEC QL CULT: NORMAL
REF LAB TEST METHOD: NORMAL
SARS-COV-2 RNA RESP QL NAA+PROBE: NOT DETECTED

## 2020-08-13 ENCOUNTER — HOSPITAL ENCOUNTER (OUTPATIENT)
Facility: HOSPITAL | Age: 45
Discharge: HOME | End: 2020-08-14
Attending: NEUROLOGICAL SURGERY | Admitting: NEUROLOGICAL SURGERY

## 2020-08-13 ENCOUNTER — ANESTHESIA EVENT (OUTPATIENT)
Dept: PERIOP | Facility: HOSPITAL | Age: 45
End: 2020-08-13

## 2020-08-13 ENCOUNTER — ANESTHESIA (OUTPATIENT)
Dept: PERIOP | Facility: HOSPITAL | Age: 45
End: 2020-08-13

## 2020-08-13 ENCOUNTER — APPOINTMENT (OUTPATIENT)
Dept: GENERAL RADIOLOGY | Facility: HOSPITAL | Age: 45
End: 2020-08-13

## 2020-08-13 DIAGNOSIS — M54.12 RADICULOPATHY OF CERVICAL SPINE: Primary | ICD-10-CM

## 2020-08-13 DIAGNOSIS — M50.30 DDD (DEGENERATIVE DISC DISEASE), CERVICAL: ICD-10-CM

## 2020-08-13 DIAGNOSIS — M47.812 CERVICAL SPONDYLOSIS WITHOUT MYELOPATHY: ICD-10-CM

## 2020-08-13 PROCEDURE — 25010000002 FENTANYL CITRATE (PF) 100 MCG/2ML SOLUTION: Performed by: NURSE ANESTHETIST, CERTIFIED REGISTERED

## 2020-08-13 PROCEDURE — 81025 URINE PREGNANCY TEST: CPT | Performed by: ANESTHESIOLOGY

## 2020-08-13 PROCEDURE — 20930 SP BONE ALGRFT MORSEL ADD-ON: CPT | Performed by: NEUROLOGICAL SURGERY

## 2020-08-13 PROCEDURE — 76000 FLUOROSCOPY <1 HR PHYS/QHP: CPT

## 2020-08-13 PROCEDURE — C1713 ANCHOR/SCREW BN/BN,TIS/BN: HCPCS | Performed by: NEUROLOGICAL SURGERY

## 2020-08-13 PROCEDURE — 25010000002 NEOSTIGMINE 10 MG/10ML SOLUTION: Performed by: NURSE ANESTHETIST, CERTIFIED REGISTERED

## 2020-08-13 PROCEDURE — 22551 ARTHRD ANT NTRBDY CERVICAL: CPT | Performed by: PHYSICIAN ASSISTANT

## 2020-08-13 PROCEDURE — 25010000002 HYDROMORPHONE PER 4 MG: Performed by: NURSE ANESTHETIST, CERTIFIED REGISTERED

## 2020-08-13 PROCEDURE — 22552 ARTHRD ANT NTRBD CERVICAL EA: CPT | Performed by: NEUROLOGICAL SURGERY

## 2020-08-13 PROCEDURE — 22551 ARTHRD ANT NTRBDY CERVICAL: CPT | Performed by: NEUROLOGICAL SURGERY

## 2020-08-13 PROCEDURE — 25010000003 CEFAZOLIN IN DEXTROSE 2-4 GM/100ML-% SOLUTION: Performed by: NEUROLOGICAL SURGERY

## 2020-08-13 PROCEDURE — 22853 INSJ BIOMECHANICAL DEVICE: CPT | Performed by: PHYSICIAN ASSISTANT

## 2020-08-13 PROCEDURE — 22845 INSERT SPINE FIXATION DEVICE: CPT | Performed by: PHYSICIAN ASSISTANT

## 2020-08-13 PROCEDURE — 22845 INSERT SPINE FIXATION DEVICE: CPT | Performed by: NEUROLOGICAL SURGERY

## 2020-08-13 PROCEDURE — 25010000002 PROPOFOL 10 MG/ML EMULSION: Performed by: NURSE ANESTHETIST, CERTIFIED REGISTERED

## 2020-08-13 PROCEDURE — 25010000002 MIDAZOLAM PER 1 MG: Performed by: ANESTHESIOLOGY

## 2020-08-13 PROCEDURE — 22552 ARTHRD ANT NTRBD CERVICAL EA: CPT | Performed by: PHYSICIAN ASSISTANT

## 2020-08-13 PROCEDURE — 22853 INSJ BIOMECHANICAL DEVICE: CPT | Performed by: NEUROLOGICAL SURGERY

## 2020-08-13 PROCEDURE — 25010000002 ONDANSETRON PER 1 MG: Performed by: NURSE ANESTHETIST, CERTIFIED REGISTERED

## 2020-08-13 PROCEDURE — 25010000002 DEXAMETHASONE SOD PHOS-NACL 10-0.9 MG/50ML-% SOLUTION: Performed by: NEUROLOGICAL SURGERY

## 2020-08-13 DEVICE — PLT SKYLINE 2LVL 32MM: Type: IMPLANTABLE DEVICE | Status: FUNCTIONAL

## 2020-08-13 DEVICE — SCRW SKYLINE VAR SD 16MM: Type: IMPLANTABLE DEVICE | Status: FUNCTIONAL

## 2020-08-13 DEVICE — CLIP LIGAT VASC HORIZON TI SM YEL 6CT: Type: IMPLANTABLE DEVICE | Status: FUNCTIONAL

## 2020-08-13 DEVICE — CLIP LIGAT VASC HORIZON TI MD BLU 6CT: Type: IMPLANTABLE DEVICE | Status: FUNCTIONAL

## 2020-08-13 DEVICE — FLOSEAL HEMOSTATIC MATRIX, 10ML
Type: IMPLANTABLE DEVICE | Status: FUNCTIONAL
Brand: FLOSEAL HEMOSTATIC MATRIX

## 2020-08-13 DEVICE — ALLOGRFT BONE VIVIGEN CELLUAR MATRX FORMABLE 1CC: Type: IMPLANTABLE DEVICE | Status: FUNCTIONAL

## 2020-08-13 DEVICE — BENGAL SYSTEM LARGE IMPLANT 5MM, 7 DEGREES
Type: IMPLANTABLE DEVICE | Status: FUNCTIONAL
Brand: BENGAL

## 2020-08-13 RX ORDER — ONDANSETRON 2 MG/ML
4 INJECTION INTRAMUSCULAR; INTRAVENOUS ONCE AS NEEDED
Status: DISCONTINUED | OUTPATIENT
Start: 2020-08-13 | End: 2020-08-13 | Stop reason: HOSPADM

## 2020-08-13 RX ORDER — IPRATROPIUM BROMIDE AND ALBUTEROL SULFATE 2.5; .5 MG/3ML; MG/3ML
3 SOLUTION RESPIRATORY (INHALATION) ONCE AS NEEDED
Status: DISCONTINUED | OUTPATIENT
Start: 2020-08-13 | End: 2020-08-13 | Stop reason: HOSPADM

## 2020-08-13 RX ORDER — PROPOFOL 10 MG/ML
VIAL (ML) INTRAVENOUS AS NEEDED
Status: DISCONTINUED | OUTPATIENT
Start: 2020-08-13 | End: 2020-08-13 | Stop reason: SURG

## 2020-08-13 RX ORDER — LABETALOL HYDROCHLORIDE 5 MG/ML
5 INJECTION, SOLUTION INTRAVENOUS
Status: DISCONTINUED | OUTPATIENT
Start: 2020-08-13 | End: 2020-08-13 | Stop reason: HOSPADM

## 2020-08-13 RX ORDER — IBUPROFEN 800 MG/1
800 TABLET ORAL ONCE
Status: COMPLETED | OUTPATIENT
Start: 2020-08-13 | End: 2020-08-13

## 2020-08-13 RX ORDER — ROCURONIUM BROMIDE 10 MG/ML
INJECTION, SOLUTION INTRAVENOUS AS NEEDED
Status: DISCONTINUED | OUTPATIENT
Start: 2020-08-13 | End: 2020-08-13 | Stop reason: SURG

## 2020-08-13 RX ORDER — HYDROCODONE BITARTRATE AND ACETAMINOPHEN 5; 325 MG/1; MG/1
1 TABLET ORAL ONCE AS NEEDED
Status: DISCONTINUED | OUTPATIENT
Start: 2020-08-13 | End: 2020-08-13 | Stop reason: HOSPADM

## 2020-08-13 RX ORDER — SODIUM CHLORIDE 0.9 % (FLUSH) 0.9 %
10 SYRINGE (ML) INJECTION EVERY 12 HOURS SCHEDULED
Status: CANCELLED | OUTPATIENT
Start: 2020-08-13

## 2020-08-13 RX ORDER — ONDANSETRON 2 MG/ML
4 INJECTION INTRAMUSCULAR; INTRAVENOUS EVERY 6 HOURS PRN
Status: DISCONTINUED | OUTPATIENT
Start: 2020-08-13 | End: 2020-08-14 | Stop reason: HOSPADM

## 2020-08-13 RX ORDER — HYDROCODONE BITARTRATE AND ACETAMINOPHEN 7.5; 325 MG/1; MG/1
1 TABLET ORAL ONCE
Status: COMPLETED | OUTPATIENT
Start: 2020-08-13 | End: 2020-08-13

## 2020-08-13 RX ORDER — SODIUM CHLORIDE 0.9 % (FLUSH) 0.9 %
10 SYRINGE (ML) INJECTION AS NEEDED
Status: CANCELLED | OUTPATIENT
Start: 2020-08-13

## 2020-08-13 RX ORDER — FAMOTIDINE 10 MG/ML
20 INJECTION, SOLUTION INTRAVENOUS ONCE
Status: CANCELLED | OUTPATIENT
Start: 2020-08-13 | End: 2020-08-13

## 2020-08-13 RX ORDER — NEOSTIGMINE METHYLSULFATE 1 MG/ML
INJECTION, SOLUTION INTRAVENOUS AS NEEDED
Status: DISCONTINUED | OUTPATIENT
Start: 2020-08-13 | End: 2020-08-13 | Stop reason: SURG

## 2020-08-13 RX ORDER — PROPOFOL 10 MG/ML
VIAL (ML) INTRAVENOUS AS NEEDED
Status: DISCONTINUED | OUTPATIENT
Start: 2020-08-13 | End: 2020-08-13

## 2020-08-13 RX ORDER — SODIUM CHLORIDE, SODIUM LACTATE, POTASSIUM CHLORIDE, CALCIUM CHLORIDE 600; 310; 30; 20 MG/100ML; MG/100ML; MG/100ML; MG/100ML
9 INJECTION, SOLUTION INTRAVENOUS CONTINUOUS
Status: DISCONTINUED | OUTPATIENT
Start: 2020-08-13 | End: 2020-08-14 | Stop reason: HOSPADM

## 2020-08-13 RX ORDER — PROMETHAZINE HYDROCHLORIDE 25 MG/1
25 TABLET ORAL ONCE AS NEEDED
Status: DISCONTINUED | OUTPATIENT
Start: 2020-08-13 | End: 2020-08-13 | Stop reason: HOSPADM

## 2020-08-13 RX ORDER — LIDOCAINE HYDROCHLORIDE 10 MG/ML
0.5 INJECTION, SOLUTION EPIDURAL; INFILTRATION; INTRACAUDAL; PERINEURAL ONCE AS NEEDED
Status: COMPLETED | OUTPATIENT
Start: 2020-08-13 | End: 2020-08-13

## 2020-08-13 RX ORDER — FAMOTIDINE 20 MG/1
20 TABLET, FILM COATED ORAL ONCE
Status: COMPLETED | OUTPATIENT
Start: 2020-08-13 | End: 2020-08-13

## 2020-08-13 RX ORDER — ONDANSETRON 4 MG/1
4 TABLET, FILM COATED ORAL EVERY 6 HOURS PRN
Status: DISCONTINUED | OUTPATIENT
Start: 2020-08-13 | End: 2020-08-14 | Stop reason: HOSPADM

## 2020-08-13 RX ORDER — SODIUM CHLORIDE 0.9 % (FLUSH) 0.9 %
3 SYRINGE (ML) INJECTION EVERY 12 HOURS SCHEDULED
Status: DISCONTINUED | OUTPATIENT
Start: 2020-08-13 | End: 2020-08-13 | Stop reason: HOSPADM

## 2020-08-13 RX ORDER — HYDROCODONE BITARTRATE AND ACETAMINOPHEN 5; 325 MG/1; MG/1
1 TABLET ORAL EVERY 4 HOURS PRN
Status: DISCONTINUED | OUTPATIENT
Start: 2020-08-13 | End: 2020-08-14 | Stop reason: HOSPADM

## 2020-08-13 RX ORDER — PROMETHAZINE HYDROCHLORIDE 25 MG/ML
6.25 INJECTION, SOLUTION INTRAMUSCULAR; INTRAVENOUS ONCE AS NEEDED
Status: DISCONTINUED | OUTPATIENT
Start: 2020-08-13 | End: 2020-08-13 | Stop reason: HOSPADM

## 2020-08-13 RX ORDER — POLYETHYLENE GLYCOL 3350 17 G/17G
17 POWDER, FOR SOLUTION ORAL DAILY PRN
Status: DISCONTINUED | OUTPATIENT
Start: 2020-08-13 | End: 2020-08-14 | Stop reason: HOSPADM

## 2020-08-13 RX ORDER — LIDOCAINE HYDROCHLORIDE AND EPINEPHRINE 5; 5 MG/ML; UG/ML
INJECTION, SOLUTION INFILTRATION; PERINEURAL AS NEEDED
Status: DISCONTINUED | OUTPATIENT
Start: 2020-08-13 | End: 2020-08-13 | Stop reason: HOSPADM

## 2020-08-13 RX ORDER — MEPERIDINE HYDROCHLORIDE 25 MG/ML
12.5 INJECTION INTRAMUSCULAR; INTRAVENOUS; SUBCUTANEOUS
Status: DISCONTINUED | OUTPATIENT
Start: 2020-08-13 | End: 2020-08-13 | Stop reason: HOSPADM

## 2020-08-13 RX ORDER — FENTANYL CITRATE 50 UG/ML
50 INJECTION, SOLUTION INTRAMUSCULAR; INTRAVENOUS
Status: DISCONTINUED | OUTPATIENT
Start: 2020-08-13 | End: 2020-08-13 | Stop reason: HOSPADM

## 2020-08-13 RX ORDER — ONDANSETRON 2 MG/ML
INJECTION INTRAMUSCULAR; INTRAVENOUS AS NEEDED
Status: DISCONTINUED | OUTPATIENT
Start: 2020-08-13 | End: 2020-08-13 | Stop reason: SURG

## 2020-08-13 RX ORDER — DEXAMETHASONE IN 0.9 % SOD CHL 10 MG/50ML
10 INTRAVENOUS SOLUTION, PIGGYBACK (ML) INTRAVENOUS ONCE
Status: COMPLETED | OUTPATIENT
Start: 2020-08-13 | End: 2020-08-13

## 2020-08-13 RX ORDER — PROMETHAZINE HYDROCHLORIDE 25 MG/1
25 SUPPOSITORY RECTAL ONCE AS NEEDED
Status: DISCONTINUED | OUTPATIENT
Start: 2020-08-13 | End: 2020-08-13 | Stop reason: HOSPADM

## 2020-08-13 RX ORDER — AMOXICILLIN 250 MG
1 CAPSULE ORAL NIGHTLY PRN
Status: DISCONTINUED | OUTPATIENT
Start: 2020-08-13 | End: 2020-08-14 | Stop reason: HOSPADM

## 2020-08-13 RX ORDER — NALOXONE HCL 0.4 MG/ML
0.4 VIAL (ML) INJECTION AS NEEDED
Status: DISCONTINUED | OUTPATIENT
Start: 2020-08-13 | End: 2020-08-13 | Stop reason: HOSPADM

## 2020-08-13 RX ORDER — ACETAMINOPHEN 325 MG/1
650 TABLET ORAL ONCE
Status: COMPLETED | OUTPATIENT
Start: 2020-08-13 | End: 2020-08-13

## 2020-08-13 RX ORDER — CEFAZOLIN SODIUM 2 G/100ML
2 INJECTION, SOLUTION INTRAVENOUS ONCE
Status: COMPLETED | OUTPATIENT
Start: 2020-08-13 | End: 2020-08-13

## 2020-08-13 RX ORDER — FENTANYL CITRATE 50 UG/ML
INJECTION, SOLUTION INTRAMUSCULAR; INTRAVENOUS AS NEEDED
Status: DISCONTINUED | OUTPATIENT
Start: 2020-08-13 | End: 2020-08-13 | Stop reason: SURG

## 2020-08-13 RX ORDER — CEFAZOLIN SODIUM 2 G/100ML
2 INJECTION, SOLUTION INTRAVENOUS EVERY 8 HOURS
Status: COMPLETED | OUTPATIENT
Start: 2020-08-13 | End: 2020-08-14

## 2020-08-13 RX ORDER — SODIUM CHLORIDE 0.9 % (FLUSH) 0.9 %
3-10 SYRINGE (ML) INJECTION AS NEEDED
Status: DISCONTINUED | OUTPATIENT
Start: 2020-08-13 | End: 2020-08-13 | Stop reason: HOSPADM

## 2020-08-13 RX ORDER — HYDRALAZINE HYDROCHLORIDE 20 MG/ML
5 INJECTION INTRAMUSCULAR; INTRAVENOUS
Status: DISCONTINUED | OUTPATIENT
Start: 2020-08-13 | End: 2020-08-13 | Stop reason: HOSPADM

## 2020-08-13 RX ORDER — HYDROMORPHONE HYDROCHLORIDE 1 MG/ML
0.5 INJECTION, SOLUTION INTRAMUSCULAR; INTRAVENOUS; SUBCUTANEOUS
Status: DISCONTINUED | OUTPATIENT
Start: 2020-08-13 | End: 2020-08-13 | Stop reason: HOSPADM

## 2020-08-13 RX ORDER — SODIUM CHLORIDE 0.9 % (FLUSH) 0.9 %
3 SYRINGE (ML) INJECTION EVERY 12 HOURS SCHEDULED
Status: DISCONTINUED | OUTPATIENT
Start: 2020-08-13 | End: 2020-08-14 | Stop reason: HOSPADM

## 2020-08-13 RX ORDER — MIDAZOLAM HYDROCHLORIDE 1 MG/ML
1 INJECTION INTRAMUSCULAR; INTRAVENOUS
Status: DISCONTINUED | OUTPATIENT
Start: 2020-08-13 | End: 2020-08-13 | Stop reason: HOSPADM

## 2020-08-13 RX ORDER — MAGNESIUM HYDROXIDE 1200 MG/15ML
LIQUID ORAL AS NEEDED
Status: DISCONTINUED | OUTPATIENT
Start: 2020-08-13 | End: 2020-08-13 | Stop reason: HOSPADM

## 2020-08-13 RX ORDER — LIDOCAINE HYDROCHLORIDE 20 MG/ML
JELLY TOPICAL AS NEEDED
Status: DISCONTINUED | OUTPATIENT
Start: 2020-08-13 | End: 2020-08-13 | Stop reason: SURG

## 2020-08-13 RX ORDER — GLYCOPYRROLATE 0.2 MG/ML
INJECTION INTRAMUSCULAR; INTRAVENOUS AS NEEDED
Status: DISCONTINUED | OUTPATIENT
Start: 2020-08-13 | End: 2020-08-13 | Stop reason: SURG

## 2020-08-13 RX ORDER — LIDOCAINE HYDROCHLORIDE 10 MG/ML
INJECTION, SOLUTION EPIDURAL; INFILTRATION; INTRACAUDAL; PERINEURAL AS NEEDED
Status: DISCONTINUED | OUTPATIENT
Start: 2020-08-13 | End: 2020-08-13 | Stop reason: SURG

## 2020-08-13 RX ORDER — SODIUM CHLORIDE 0.9 % (FLUSH) 0.9 %
10 SYRINGE (ML) INJECTION AS NEEDED
Status: DISCONTINUED | OUTPATIENT
Start: 2020-08-13 | End: 2020-08-14 | Stop reason: HOSPADM

## 2020-08-13 RX ORDER — DOCUSATE SODIUM 100 MG/1
100 CAPSULE, LIQUID FILLED ORAL 2 TIMES DAILY PRN
Status: DISCONTINUED | OUTPATIENT
Start: 2020-08-13 | End: 2020-08-14 | Stop reason: HOSPADM

## 2020-08-13 RX ADMIN — FENTANYL CITRATE 50 MCG: 0.05 INJECTION, SOLUTION INTRAMUSCULAR; INTRAVENOUS at 16:10

## 2020-08-13 RX ADMIN — CEFAZOLIN SODIUM 2 G: 2 INJECTION, SOLUTION INTRAVENOUS at 13:15

## 2020-08-13 RX ADMIN — HYDROCODONE BITARTRATE AND ACETAMINOPHEN 1 TABLET: 7.5; 325 TABLET ORAL at 10:38

## 2020-08-13 RX ADMIN — HYDROCODONE BITARTRATE AND ACETAMINOPHEN 1 TABLET: 5; 325 TABLET ORAL at 17:13

## 2020-08-13 RX ADMIN — FAMOTIDINE 20 MG: 20 TABLET, FILM COATED ORAL at 10:38

## 2020-08-13 RX ADMIN — MIDAZOLAM 1 MG: 1 INJECTION INTRAMUSCULAR; INTRAVENOUS at 12:30

## 2020-08-13 RX ADMIN — PROPOFOL 200 MG: 10 INJECTION, EMULSION INTRAVENOUS at 13:16

## 2020-08-13 RX ADMIN — Medication 10 MG: at 13:30

## 2020-08-13 RX ADMIN — SODIUM CHLORIDE, POTASSIUM CHLORIDE, SODIUM LACTATE AND CALCIUM CHLORIDE 9 ML/HR: 600; 310; 30; 20 INJECTION, SOLUTION INTRAVENOUS at 10:33

## 2020-08-13 RX ADMIN — LIDOCAINE HYDROCHLORIDE 2 ML: 20 JELLY TOPICAL at 13:16

## 2020-08-13 RX ADMIN — HYDROMORPHONE HYDROCHLORIDE 0.5 MG: 1 INJECTION, SOLUTION INTRAMUSCULAR; INTRAVENOUS; SUBCUTANEOUS at 15:30

## 2020-08-13 RX ADMIN — HYDROMORPHONE HYDROCHLORIDE 0.5 MG: 1 INJECTION, SOLUTION INTRAMUSCULAR; INTRAVENOUS; SUBCUTANEOUS at 15:40

## 2020-08-13 RX ADMIN — PROPOFOL 25 MCG/KG/MIN: 10 INJECTION, EMULSION INTRAVENOUS at 13:29

## 2020-08-13 RX ADMIN — HYDROCODONE BITARTRATE AND ACETAMINOPHEN 1 TABLET: 5; 325 TABLET ORAL at 22:04

## 2020-08-13 RX ADMIN — LIDOCAINE HYDROCHLORIDE 0.5 ML: 10 INJECTION, SOLUTION EPIDURAL; INFILTRATION; INTRACAUDAL; PERINEURAL at 10:33

## 2020-08-13 RX ADMIN — FENTANYL CITRATE 50 MCG: 0.05 INJECTION, SOLUTION INTRAMUSCULAR; INTRAVENOUS at 15:55

## 2020-08-13 RX ADMIN — SODIUM CHLORIDE, POTASSIUM CHLORIDE, SODIUM LACTATE AND CALCIUM CHLORIDE 9 ML/HR: 600; 310; 30; 20 INJECTION, SOLUTION INTRAVENOUS at 16:06

## 2020-08-13 RX ADMIN — ROCURONIUM BROMIDE 5 MG: 10 INJECTION INTRAVENOUS at 13:16

## 2020-08-13 RX ADMIN — CEFAZOLIN SODIUM 2 G: 2 INJECTION, SOLUTION INTRAVENOUS at 21:58

## 2020-08-13 RX ADMIN — LIDOCAINE HYDROCHLORIDE 50 MG: 10 INJECTION, SOLUTION EPIDURAL; INFILTRATION; INTRACAUDAL; PERINEURAL at 13:16

## 2020-08-13 RX ADMIN — NEOSTIGMINE 3 MG: 1 INJECTION INTRAVENOUS at 14:56

## 2020-08-13 RX ADMIN — ONDANSETRON 4 MG: 2 INJECTION INTRAMUSCULAR; INTRAVENOUS at 13:31

## 2020-08-13 RX ADMIN — ACETAMINOPHEN 650 MG: 325 TABLET ORAL at 10:39

## 2020-08-13 RX ADMIN — GLYCOPYRROLATE 0.4 MG: 0.2 INJECTION INTRAMUSCULAR; INTRAVENOUS at 14:56

## 2020-08-13 RX ADMIN — IBUPROFEN 800 MG: 800 TABLET, FILM COATED ORAL at 10:38

## 2020-08-13 RX ADMIN — ROCURONIUM BROMIDE 25 MG: 10 INJECTION INTRAVENOUS at 13:22

## 2020-08-13 RX ADMIN — FENTANYL CITRATE 100 MCG: 50 INJECTION, SOLUTION INTRAMUSCULAR; INTRAVENOUS at 14:59

## 2020-08-13 NOTE — PLAN OF CARE
Problem: Patient Care Overview  Goal: Plan of Care Review  Outcome: Ongoing (interventions implemented as appropriate)  Note:   Pt from PACU, alert and oriented. Pt ambulated from stretcher to bed. Anterior neck dressing CDI JESSE in place. Pt on room air. VSS

## 2020-08-13 NOTE — ANESTHESIA PROCEDURE NOTES
Airway  Urgency: elective    Date/Time: 8/13/2020 1:16 PM  Airway not difficult    General Information and Staff    Patient location during procedure: OR  CRNA: Kendall Cr III, CRNA    Indications and Patient Condition  Indications for airway management: airway protection    Preoxygenated: yes  MILS not maintained throughout  Mask difficulty assessment: 0 - not attempted    Final Airway Details  Final airway type: endotracheal airway      Successful airway: ETT  Cuffed: yes   Successful intubation technique: video laryngoscopy  Facilitating devices/methods: intubating stylet  Blade: Hazel  Blade size: 3  ETT size (mm): 7.0  Cormack-Lehane Classification: grade I - full view of glottis  Placement verified by: chest auscultation and capnometry   Measured from: lips  ETT/EBT  to lips (cm): 21  Number of attempts at approach: 1  Assessment: lips, teeth, and gum same as pre-op and atraumatic intubation    Additional Comments  Negative epigastric sounds, Breath sound equal bilaterally with symmetric chest rise and fall.

## 2020-08-13 NOTE — ANESTHESIA PREPROCEDURE EVALUATION
Anesthesia Evaluation     Patient summary reviewed   no history of anesthetic complications:  NPO Solid Status: > 8 hours  NPO Liquid Status: > 2 hours           Airway   Mallampati: II  TM distance: >3 FB  Neck ROM: full  No difficulty expected  Dental - normal exam     Pulmonary     breath sounds clear to auscultation  (+) a smoker (quit 2010; 5 py hx) Former,   (-) COPD, asthma, shortness of breath, recent URI, sleep apnea, no home oxygen  Cardiovascular   Exercise tolerance: good (4-7 METS)    ECG reviewed  Rhythm: regular  Rate: normal    (+) hypertension, hyperlipidemia,   (-) past MI, dysrhythmias, angina, CHF, cardiac stents, DVT      Neuro/Psych  (+) headaches, numbness,     (-) seizures, TIA, CVA  GI/Hepatic/Renal/Endo    (+) obesity,     (-) liver disease, no renal disease, diabetes, no thyroid disorder    Musculoskeletal     Abdominal    Substance History - negative use     OB/GYN          Other   arthritis, blood dyscrasia anemia,     ROS/Med Hx Other: C5-7 anterior cervical discectomy and fusion today for C5/6  Truncation/impingement on the right C6 nerveroot  ; hx DDD/cervical spondylosis without myelopathy + RUE weakness/numbness      Phys Exam Other: Alert & oriented x3.                Anesthesia Plan    ASA 2     general   (Risks and benefits of general anesthesia discussed with patient, questions answered, agreeable to proceed.  Will plan for video laryngoscopy with midline neck stabilization given hx of radiculopathy.  Discussed need for possible post-op ventilator requirement and ICU.  )  intravenous induction     Anesthetic plan, all risks, benefits, and alternatives have been provided, discussed and informed consent has been obtained with: patient.  Use of blood products discussed with patient  Consented to blood products.   Plan discussed with CRNA.

## 2020-08-13 NOTE — INTERVAL H&P NOTE
"Pre-Op H&P (See Recent Office Note Attached for Full H&P)    Chief complaint: Neck pain into RUE, now with some left UE pain/numbness    Review of Systems:  General ROS:  no fever, chills, rashes.  No change since last office visit.  No recent sick exposure  Cardiovascular ROS: no chest pain or dyspnea on exertion  Respiratory ROS: no cough, shortness of breath, or wheezing    Meds:    No current facility-administered medications on file prior to encounter.      Current Outpatient Medications on File Prior to Encounter   Medication Sig Dispense Refill   • ibuprofen (ADVIL,MOTRIN) 800 MG tablet Take 800 mg by mouth Every 6 (Six) Hours As Needed for Mild Pain .     • [DISCONTINUED] chlorhexidine (HIBICLENS) 4 % external liquid Shower each day with solution for 5 days beginning 5 days before surgery. 120 mL 0       Vital Signs:  /87 (BP Location: Right arm, Patient Position: Lying)   Pulse 80   Temp 97.2 °F (36.2 °C) (Temporal)   Resp 16   Ht 175.3 cm (69\")   Wt 86.6 kg (191 lb)   LMP 07/20/2020 (Approximate)   SpO2 97%   BMI 28.21 kg/m²     Physical Exam:    CV:  S1S2 regular rate and rhythm, no murmur               Resp:  Clear to auscultation; respirations regular, even and unlabored    Results Review:     Lab Results   Component Value Date    WBC 6.07 08/10/2020    HGB 11.0 (L) 08/10/2020    HCT 36.0 08/10/2020    MCV 88.2 08/10/2020     08/10/2020    GLUCOSE 112 (H) 08/10/2020    BUN 14 08/10/2020    CREATININE 0.99 08/10/2020    EGFRIFNONA 61 08/10/2020     08/10/2020    K 4.2 08/10/2020     08/10/2020    CO2 22.0 08/10/2020    CALCIUM 9.2 08/10/2020        I reviewed the patient's new clinical results.    Cancer Staging (if applicable)  Cancer Patient: __ yes _x_no __unknown; If yes, clinical stage T:__ N:__M:__, stage group or __N/A    Assessment/Plan:    This is a 45-year-old woman who has a greater than one-year history of right-sided neck pain.  She endorses pain radiating " into what sounds like the C6 and C7 distributions in the right arm.  She has failed physical therapy and interventional pain management.  She is a candidate for a C5-7 ACDF.    Informed consent was obtained for an ACDF. She acknowledges a risk of spinal cord injury, paralysis, nerve root injury, pain, paralysis, loss of sensation, permanent neurological impairment, tracheal/esophageal injury, hoarseness, dysphaghia, vocal cord palsy, bleeding, infection, CSF leak, iatrogenic instability, exacerbation of adjacent level disease, pseudoarthrosis, instrumentation failure, failure of benefit of the procedure, or need for additional procedures. All questions were answered. No guarantees were given or implied. She elects to proceed.    I reviewed the pertinent anatomy with the aid of a 3-dimensional model of the spine.    Viviane Castanon, APRN  8/13/2020   11:56

## 2020-08-13 NOTE — ANESTHESIA POSTPROCEDURE EVALUATION
Patient: Yuni Cowan    Procedure Summary     Date:  08/13/20 [facility] / [facility]:   [facility]    Anesthesia Start:  1309 Anesthesia Stop:      Procedure:  C5-7 anterior cervical discectomy and fusion (N/A Spine Cervical) Diagnosis:       Cervical spondylosis without myelopathy      Radiculopathy of cervical spine      (Cervical spondylosis without myelopathy [M47.812])      (Radiculopathy of cervical spine [M54.12])    Surgeon:  Felipe Gu MD Provider:  Roxy Pavon DO    Anesthesia Type:  general ASA Status:  2          Anesthesia Type: general    Vitals  Vitals Value Taken Time   /53 8/13/2020  3:09 PM   Temp     Pulse     Resp     SpO2 97 % 8/13/2020  3:11 PM   Vitals shown include unvalidated device data.    RR 10   T 97  HR 86    Post Anesthesia Care and Evaluation    Patient location during evaluation: PACU  Patient participation: complete - patient participated  Level of consciousness: awake and alert  Pain score: 0  Pain management: adequate  Airway patency: patent  Anesthetic complications: No anesthetic complications  PONV Status: none  Cardiovascular status: hemodynamically stable and acceptable  Respiratory status: nonlabored ventilation, acceptable and nasal cannula  Hydration status: acceptable

## 2020-08-13 NOTE — OP NOTE
Pre-operative diagnosis: Cervical radiculopathy, cervical spondylosis without myelopathy,  Post-operative diagnosis: Same    Procedures performed:  1.  C5-7 anterior cervical discectomy and fusion  2.  Application of anterior instrumentation, 2 spinal segments, C5-7  3.  Insertion of machined interbody graft (DePuy 5 mm x 2)    Procedure in detail: The patient was identified in the preoperative holding area and brought to the operating suite where She was transferred to the operating table. she then underwent the uneventful induction of general, endotracheal anesthesia. Venodynes were placed by the nursing staff. Her head was gently extended and a shoulder roll was placed transversely under the shoulders. The C-arm was used to localize the skin overlying the operative level. The skin was prepped with alcohol and an obliquely-oriented skin incision extending from the medial border of the right  sternocleidomastoid to the midline was then drawn. The operative field was then shaved, prepped, and draped in the usual sterile fashion. A time out was performed and intravenous antibiotics were administered. Local anesthetic with epinephrine was then infiltrated into the skin underlying the planned incision. A 15 blade was used to make the skin incision. Bipolar electrocautery was used for hemostasis. A self-retaining retractor was placed and sequentially advanced. The platysma was sharply opened. The avascular plane medial to the sternocleidomastoid and lateral to the midline structures was developed using a combination of blunt and sharp dissection. The Cloward retractor was used to retract the midline structures to the contralateral side. A peanut was used to bluntly develop the prevertebral space. The longus coli was opened with a protected Bovie.    The C6/7 disc space was identified and the level was confirmed with lateral fluoroscopy.    The Bovie was used to clear off the anterior aspects of the vertebral bodies  above and below the operative level. The Trimline retractor blades were placed. Ranier pins were placed in the vertebral bodies above and below the operative level. An annulotomy was carried out using a 15 blade. Gentle distraction was applied across the disc space via the Ranier pins. A complete discectomy was then carried out using the Montserrat curettes, pituitary rongeurs, and the high-speed drill. The posterior longitudinal ligament was then opened using a Black hook and the posterior osteophytes were resected using the Kerrison rongeurs. The endplates were meticulously prepared using the Montserrat curettes. After trialling, a 5 mm DePuy titanium interbody was packed with allograft and was inserted into the disc space. Fluoroscopy was used to confirm the operative level and demonstrated satisfactory positioning of the implant.    The Ranier pin was removed from the C7 vertebral body and placed in the C5 vertebral body.  In the manner similar to what is described above, a complete discectomy was carried out at C5-6.  After trialing, a 5 mm DePuy titanium interbody was packed with allograft and was inserted into the disc space.  Lateral fluoroscopy was used to demonstrate satisfactory position of the implant.    The Ranier pins were withdrawn.  The anterior osteophytes were drilled down.    A 32 mm anterior plate was then sized and affixed using a total of 6 screws measuring 3.5 x 16 mm each.    PA and lateral fluoroscopy demonstrated satisfactory positioning of the implants and again confirmed the operative level(s). The field was then copiously irrigated with irrigation. Hemostasis was excellent. A 7 flat JESSE drain was placed in the pre-vertebral space and tunneled out through a separate stab incision. The platysma and skin were closed in layers. Glue and a sterile dressing were then applied.     Sponge, instrument and needle counts were all correct at the end of the case.  Mamie Dent, physician assistant was  responsible for performing the following activities: Retraction, Suction, Irrigation, Suturing and Closing and their skilled assistance was necessary for the success of this case.

## 2020-08-14 VITALS
WEIGHT: 191 LBS | DIASTOLIC BLOOD PRESSURE: 80 MMHG | BODY MASS INDEX: 28.29 KG/M2 | SYSTOLIC BLOOD PRESSURE: 131 MMHG | RESPIRATION RATE: 18 BRPM | HEIGHT: 69 IN | OXYGEN SATURATION: 8 % | HEART RATE: 86 BPM | TEMPERATURE: 97.9 F

## 2020-08-14 PROCEDURE — 99024 POSTOP FOLLOW-UP VISIT: CPT | Performed by: PHYSICIAN ASSISTANT

## 2020-08-14 PROCEDURE — 25010000003 CEFAZOLIN IN DEXTROSE 2-4 GM/100ML-% SOLUTION: Performed by: NEUROLOGICAL SURGERY

## 2020-08-14 RX ORDER — HYDROCODONE BITARTRATE AND ACETAMINOPHEN 5; 325 MG/1; MG/1
1 TABLET ORAL EVERY 4 HOURS PRN
Qty: 30 TABLET | Refills: 0 | Status: SHIPPED | OUTPATIENT
Start: 2020-08-14 | End: 2020-08-28

## 2020-08-14 RX ORDER — METHYLPREDNISOLONE 4 MG/1
TABLET ORAL
Qty: 21 EACH | Refills: 0 | Status: SHIPPED | OUTPATIENT
Start: 2020-08-14 | End: 2020-08-28

## 2020-08-14 RX ADMIN — HYDROCODONE BITARTRATE AND ACETAMINOPHEN 1 TABLET: 5; 325 TABLET ORAL at 07:55

## 2020-08-14 RX ADMIN — HYDROCODONE BITARTRATE AND ACETAMINOPHEN 1 TABLET: 5; 325 TABLET ORAL at 03:35

## 2020-08-14 RX ADMIN — CEFAZOLIN SODIUM 2 G: 2 INJECTION, SOLUTION INTRAVENOUS at 05:14

## 2020-08-14 RX ADMIN — SODIUM CHLORIDE, PRESERVATIVE FREE 3 ML: 5 INJECTION INTRAVENOUS at 07:55

## 2020-08-14 RX ADMIN — POLYETHYLENE GLYCOL 3350 17 G: 17 POWDER, FOR SOLUTION ORAL at 07:55

## 2020-08-14 NOTE — PLAN OF CARE
Problem: Patient Care Overview  Goal: Plan of Care Review  Flowsheets (Taken 8/14/2020 0644)  Progress: improving  Plan of Care Reviewed With: patient  Outcome Summary: pt VSS, A&O x4, RA. complaint of moderate pain - managed with PRN pain meds. Ambulated to bathroom with standy by assist. Voding adequately. Sancho drain to bulb suction. Will continue to monitor.

## 2020-08-14 NOTE — DISCHARGE SUMMARY
DISCHARGE SUMMARY  PATIENT:  LYNDON RUBIN  YOB: 1975  VISIT ID:  [id number]  PRIMARY CARE:  Florentino Ahn APRN  ADMITTING PHYSICIAN:  Felipe Gu*    DATE OF ADMISSION:  8/13/2020  DATE OF DISCHARGE:  08/14/20      ADMITTING DIAGNOSIS:  Cervical radiculopathy, cervical spondylosis without myelopathy    DISCHARGE DIAGNOSIS:  Cervical radiculopathy, cervical spondylosis without myelopathy    Past Medical History:   Diagnosis Date   • Anemia    • Arthritis    • Headache          PROCEDURES:  C5-7 anterior cervical discectomy and fusion    BRIEF HOSPITAL COURSE:  Ms. Rubin is a 45 y.o. female who presented to our service with a one-year history of progressive right-sided neck pain with radiating pain and numbness into her right shoulder and right hand.  Preoperative MRI demonstrated degenerative disc disease at C5-6 and C6-7 with disc osteophyte complexes causing severe right foraminal stenosis.  The patient failed to have improvement with conservative therapies.  Operative intervention was indicated and she underwent an uncomplicated C5-7 ACDF on 8/13/2020.  A JESSE drain was left in place postoperatively and had minimal output overnight.  This was removed prior to discharge.    Throughout her hospital stay, vital signs remained able.  Postop dressing was clean, dry and intact.  Motor and sensory function were found to be intact throughout.  She was ambulatory and voiding independently.  Patient tolerated p.o. diet without nausea or vomiting.  Pain was well-controlled with oral medication at the time of discharge on POD 1, 8/14/2020.    DISCHARGE MEDICATIONS:     Discharge Medications      New Medications      Instructions Start Date   HYDROcodone-acetaminophen 5-325 MG per tablet  Commonly known as:  NORCO   1 tablet, Oral, Every 4 Hours PRN      methylPREDNISolone 4 MG tablet  Commonly known as:  MEDROL (KRISS)   Take as directed on package instructions.         Stop These  Medications    Antiseptic Skin Cleanser 4 % solution  Generic drug:  Chlorhexidine Gluconate     ibuprofen 800 MG tablet  Commonly known as:  YESSYMOTSANTO          Discharge to home    ACTIVITY:  As tolerated  No strenuous activity  No heavy bending lifting twisting    DIET:  As tolerated    FOLLOW UP:  2 weeks with neurosurgery PA    Follow-up Information     Florentino Ahn APRN .    Specialty:  Family Medicine  Contact information:  [address] [place] [id number]  [phone]             Mamie Dent PA-C Follow up.    Specialties:  Physician Assistant, Neurosurgery  Why:  You have a post op appointment on Friday, August 28th with Mamie Dent PA-C - please arrive at 10:15 AM for a 10:30 AM appointment.    Contact information:  [facility] [address] [address] [phone]

## 2020-08-28 ENCOUNTER — OFFICE VISIT (OUTPATIENT)
Dept: NEUROSURGERY | Facility: CLINIC | Age: 45
End: 2020-08-28

## 2020-08-28 VITALS — TEMPERATURE: 98 F | HEIGHT: 69 IN | BODY MASS INDEX: 28.14 KG/M2 | WEIGHT: 190 LBS

## 2020-08-28 DIAGNOSIS — Z98.1 S/P CERVICAL SPINAL FUSION: Primary | ICD-10-CM

## 2020-08-28 DIAGNOSIS — Z09 POSTOPERATIVE FOLLOW-UP: ICD-10-CM

## 2020-08-28 PROCEDURE — 99024 POSTOP FOLLOW-UP VISIT: CPT | Performed by: PHYSICIAN ASSISTANT

## 2020-08-28 RX ORDER — ACETAMINOPHEN 500 MG
500 TABLET ORAL EVERY 6 HOURS PRN
COMMUNITY

## 2020-08-28 NOTE — PROGRESS NOTES
HPI  Yuni Cowan presents for postoperative follow up s/p C5-7 ACDF on 8/13/2020. The patient has had a relatively normal postoperative course.  The patient has had no current complaints. The patient has had improving normal postoperative pain.  The patient has had no issues with the wound. The patient's reports significant improvement in preoperative upper extremity pain. She has had some soreness in her neck. She initially had mild dysphagia but feels this is improving. She is taking narcotics at bedtime.          Physical Exam  General:  alert, appears stated age and cooperative  Incision:   healing well, no drainage, no seroma, no swelling, incision well approximated     Assessment/Plan   Assessment:    1. S/P cervical spinal fusion    2. Postoperative follow-up        Plan:   Continue postoperative wound care as instructed  Patient is doing very well and is extremely pleased with postoperative progress.   F/u in 4 weeks with post-op xrays. Discussed increasing activities and limitations and restrictions.     Orders Placed This Encounter   Procedures   • XR Spine Cervical 2 View        Return in 4 weeks (on 9/25/2020).     Mamie Dent PA-C  8/28/2020

## 2020-09-23 ENCOUNTER — OFFICE VISIT (OUTPATIENT)
Dept: NEUROSURGERY | Facility: CLINIC | Age: 45
End: 2020-09-23

## 2020-09-23 ENCOUNTER — HOSPITAL ENCOUNTER (OUTPATIENT)
Dept: GENERAL RADIOLOGY | Facility: HOSPITAL | Age: 45
Discharge: HOME | End: 2020-09-23
Admitting: PHYSICIAN ASSISTANT

## 2020-09-23 VITALS
WEIGHT: 189 LBS | DIASTOLIC BLOOD PRESSURE: 80 MMHG | BODY MASS INDEX: 27.99 KG/M2 | SYSTOLIC BLOOD PRESSURE: 118 MMHG | TEMPERATURE: 96.6 F | HEIGHT: 69 IN

## 2020-09-23 DIAGNOSIS — M47.812 CERVICAL SPONDYLOSIS WITHOUT MYELOPATHY: ICD-10-CM

## 2020-09-23 DIAGNOSIS — Z98.1 S/P CERVICAL SPINAL FUSION: ICD-10-CM

## 2020-09-23 DIAGNOSIS — Z09 POSTOPERATIVE FOLLOW-UP: Primary | ICD-10-CM

## 2020-09-23 PROCEDURE — 99024 POSTOP FOLLOW-UP VISIT: CPT | Performed by: PHYSICIAN ASSISTANT

## 2020-09-23 PROCEDURE — 72040 X-RAY EXAM NECK SPINE 2-3 VW: CPT

## 2020-09-23 NOTE — PROGRESS NOTES
HPI  Yuni Cowan presents for postoperative follow up s/p C5-7 ACDF on 8/13/2020. The patient has had a relatively normal postoperative course.  The patient has had no current complaints. The patient has had no issues with the wound. The patient's reports Resolution of her preoperative upper extremity pain and numbness.  She is very pleased with her postoperative results.  She has resumed all activities without difficulty.  She is not taking any pain medications.         Physical Exam  General:  alert, appears stated age and cooperative  Incision:   healing well, no drainage, no seroma, incision well approximated     Assessment/Plan   Assessment:    1. Postoperative follow-up    2. Cervical spondylosis without myelopathy    3. S/P cervical spinal fusion      Cervical x-ray (9/23/2020): Postoperative hardware seen C5-7.  Excellent positioning and alignment of hardware.  No abnormalities.    Plan:   Ms. Cowan is seen today in postoperative follow-up 6 weeks status post C5-7 ACDF.  She is doing excellent and is very pleased with her postoperative outcome.  She has had complete resolution of her preoperative upper extremity pain and numbness.  We discussed continuing to increase her activities as tolerated.  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  I instructed her to call with any new or worsening symptoms.  Otherwise, we will follow with her on an as-needed basis moving forward.        Return if symptoms worsen or fail to improve.     Mamie Dent PA-C  9/23/2020

## 2021-07-06 ENCOUNTER — TELEPHONE (OUTPATIENT)
Dept: NEUROSURGERY | Facility: CLINIC | Age: 46
End: 2021-07-06

## 2021-07-06 RX ORDER — METHOCARBAMOL 750 MG/1
750 TABLET, FILM COATED ORAL 3 TIMES DAILY
Qty: 60 TABLET | Refills: 1 | Status: SHIPPED | OUTPATIENT
Start: 2021-07-06

## 2021-07-06 RX ORDER — METHYLPREDNISOLONE 4 MG/1
TABLET ORAL
Qty: 21 TABLET | Refills: 0 | Status: SHIPPED | OUTPATIENT
Start: 2021-07-06

## 2021-07-06 NOTE — TELEPHONE ENCOUNTER
Caller: LYNDON RUBIN    Relationship to patient: SELF    Best call back number: [phone]    Chief complaint:  PAIN RIGHT SIDE OF NECK    Type of visit: F/U     Requested date: AS SOON AS POSSIBLE    If rescheduling, when is the original appointment:     Additional notes: PT WAS LAST SEEN ON 09/23/2020 BY FLASH FRAUSTO.  PT STATES SHE HAD SX AND RECENTLY FOR ABOUT 3 WEEKS ALL OF THE PAIN HAS COME BACK.  PT STATES SHE ORIGINALLY THOUGHT SHE HAD PULLED A MUSCLE.  PT STATES THE PAIN IS CONSTANT BUT SOMETIMES WHEN TURNING HER HEAD THE PAINS BECOME A SHARP PAIN.  PT STATES SHE IS TAKING IBUPROFEN 800 MG QID.  PT STATES THE IBUPROFEN WILL DULL THE PAIN AND SHE STATES THE SHE HAS TRIED ICE AND HEAT.  PT DENIES LOSS OF BOWEL OR BLADDER.    PLEASE CALL PT  THANK YOU

## 2021-07-06 NOTE — TELEPHONE ENCOUNTER
Provider: Riccardo   Caller: Yuni  Time of call:  SAMSON took call   Phone #:  [phone]  Surgery:  c5-7 ACDF  Surgery Date:  8/13/2020  Last visit:   9/23/2020  Next visit: PRN       Reason for call:       Please see message from SAMSON- Unable to obtain additional information at this time.

## 2021-07-06 NOTE — TELEPHONE ENCOUNTER
"I have called and spoken with patient.  She notes that she has had a \"ball\" of muscle with in her shoulder/neck pain.  She does note radiating pain into her shoulder blade and down to her elbow.  She has attempted ibuprofen 800 mg that does help dull the pain.    Therefore, I have called in a Medrol Dosepak and Robaxin to help with patient's symptomatology.  She is going to start this today.    Patient notes that she would like to be seen.  Therefore, please schedule patient next week with a PA for further evaluation.  It was noted to patient that if her symptoms are improved, she can cancel this appointment.  "

## 2023-08-28 ENCOUNTER — TELEPHONE (OUTPATIENT)
Dept: NEUROSURGERY | Facility: CLINIC | Age: 48
End: 2023-08-28
Payer: COMMERCIAL

## 2023-08-28 NOTE — TELEPHONE ENCOUNTER
Caller: Yuni Cowan    Relationship to patient: Self    Best call back number: [phone]    Type of visit: FOLLOW UP EXTENDED     Requested date: 8/28/23    If rescheduling, when is the original appointment: N/A    Additional notes:  PATIENT CALLED IN AND WOULD LIKE TO BE SEEN IN THE OFFICE.  STATES SHE IS HAVING PAIN ON HER SHOULDER AND RIGHT ARM.  RIGHT ARM IS GOING NUMB ON HER AS WELL.  LAST TIME SHE WAS SEEN IN OFFICE WAS 09/23/23.  PAIN LEVEL 5-6/10 - HAS BEEN TAKING IBUPROFEN     PLEASE CALL PATIENT WITH ANY ADVICE    THANK YOU!

## 2023-08-28 NOTE — TELEPHONE ENCOUNTER
Spoke to patient. I have deferred for her to see her PCP first for an evaluation and work up. She would need updated imaging and referral to be seen back by our practice.

## 2024-11-07 ENCOUNTER — OFFICE VISIT (OUTPATIENT)
Dept: CARDIOLOGY | Facility: CLINIC | Age: 49
End: 2024-11-07
Payer: COMMERCIAL

## 2024-11-07 VITALS
HEART RATE: 67 BPM | BODY MASS INDEX: 28.94 KG/M2 | HEIGHT: 69 IN | DIASTOLIC BLOOD PRESSURE: 70 MMHG | WEIGHT: 195.4 LBS | SYSTOLIC BLOOD PRESSURE: 128 MMHG

## 2024-11-07 DIAGNOSIS — R06.02 SHORTNESS OF BREATH: ICD-10-CM

## 2024-11-07 DIAGNOSIS — E78.00 HYPERCHOLESTEROLEMIA: ICD-10-CM

## 2024-11-07 DIAGNOSIS — R00.2 PALPITATIONS: ICD-10-CM

## 2024-11-07 DIAGNOSIS — R07.89 OTHER CHEST PAIN: Primary | ICD-10-CM

## 2024-11-07 PROCEDURE — 1159F MED LIST DOCD IN RCRD: CPT | Performed by: NURSE PRACTITIONER

## 2024-11-07 PROCEDURE — 93000 ELECTROCARDIOGRAM COMPLETE: CPT | Performed by: NURSE PRACTITIONER

## 2024-11-07 PROCEDURE — 99203 OFFICE O/P NEW LOW 30 MIN: CPT | Performed by: NURSE PRACTITIONER

## 2024-11-07 PROCEDURE — 1160F RVW MEDS BY RX/DR IN RCRD: CPT | Performed by: NURSE PRACTITIONER

## 2024-11-07 RX ORDER — MULTIVIT-MIN/IRON/FOLIC ACID/K 18-600-40
1 CAPSULE ORAL DAILY
COMMUNITY

## 2024-11-07 RX ORDER — NAPROXEN SODIUM 220 MG/1
220 TABLET, FILM COATED ORAL AS NEEDED
COMMUNITY

## 2024-11-07 RX ORDER — IBUPROFEN 200 MG
200 TABLET ORAL AS NEEDED
COMMUNITY

## 2024-11-07 NOTE — PROGRESS NOTES
Subjective   Yuni Cowan is a 49 y.o. female seen in the office today for cardiac consultation.  Her primary concerns are chest tightness, increase shortness of breath, and heart palpitations.  The symptoms occur when under stress or with activity.  Palpitations are fluttering in nature and last 20 to 30 seconds.  At times palpitations are associated with ears feeling full and lightheadedness.    PMH: Cervical disc disease s/p anterior cervical dissection and fusion.    PMH is negative for: Diabetes, thyroid problems, headache or seizures, prior cardiovascular disease, sleep apnea or pulmonary issues    Patient admitts to limited caffeine, occasional cola and is a non-smoker.    FMH significant for father having heart disease and patient at our office.  Mother has DM.  Son has cardiac murmur and diabetes    Chief Complaint   Patient presents with    Establish Care     Referred per PCP for palpitations    Palpitations     Patient reports  she has noticed palpitations and has been getting worse .  Has chest tightness and SOA  feels for the last 6-8 months she has more trouble catching her breath .    LABS and TESTING     LABS- Current labs  August 2024  CHO- 229 TRI- 100  HDL- 64  LDL- 147  No Cardiac testing       Initial cardiac evaluation; CP, SOA, palpitations      Cardiac History  Past Surgical History:   Procedure Laterality Date    ANTERIOR CERVICAL DISCECTOMY W/ FUSION N/A 8/13/2020    Procedure: anterior cervical discectomy and fusion C5-7;  Surgeon: Felipe Gu MD;  Location: [facility] OR;  Service: Neurosurgery;  Laterality: N/A;    CERVICAL EPIDURAL N/A 2/5/2020    Procedure: CERVICAL EPIDURAL;  Surgeon: Aman Chatterjee DO;  Location:  [facility] OR;  Service: Pain Management;  Laterality: N/A;    ENDOSCOPY      MI MYELOGRAPHY VIA LUMBAR INJECTION RS&I CERVICAL N/A 10/9/2019    Procedure: IR myelogram, cervical;  Surgeon: Jose Amado MD;  Location:  [facility];   Service: Interventional Radiology    TONSILLECTOMY      TUBAL ABDOMINAL LIGATION      WISDOM TOOTH EXTRACTION         Current Outpatient Medications   Medication Sig Dispense Refill    ibuprofen (ADVIL,MOTRIN) 200 MG tablet Take 1 tablet by mouth As Needed for Mild Pain.      naproxen sodium (ALEVE) 220 MG tablet Take 1 tablet by mouth As Needed.      Vitamin D, Cholecalciferol, 50 MCG (2000 UT) capsule Take 1 capsule by mouth Daily.       No current facility-administered medications for this visit.       Patient has no known allergies.    Past Medical History:   Diagnosis Date    Anemia     Arthritis     Headache     Vitamin D deficiency        Social History     Socioeconomic History    Marital status:    Tobacco Use    Smoking status: Former     Current packs/day: 0.00     Average packs/day: 1 pack/day for 5.0 years (5.0 ttl pk-yrs)     Types: Cigarettes     Start date:      Quit date:      Years since quittin.8    Smokeless tobacco: Never   Vaping Use    Vaping status: Never Used   Substance and Sexual Activity    Alcohol use: No    Drug use: No    Sexual activity: Defer       Family History   Problem Relation Age of Onset    Asthma Mother     Diabetes Mother     Hyperlipidemia Father     Heart disease Father     Hypertension Father     Diabetes Brother     Liver disease Maternal Grandmother     Kidney disease Maternal Grandfather     Heart disease Paternal Grandmother     Kidney disease Paternal Grandfather     Diabetes Son        Review of Systems   Constitutional:  Negative for diaphoresis and fatigue.   HENT:  Negative for congestion, dental problem, sinus pain, tinnitus and trouble swallowing.    Eyes:  Negative for visual disturbance.   Respiratory:  Positive for shortness of breath. Negative for apnea, cough, chest tightness and wheezing.    Cardiovascular:  Positive for chest pain, palpitations and leg swelling (At times associated to prolonged sitting or standing).  "  Gastrointestinal: Negative.    Endocrine: Negative for polydipsia, polyphagia and polyuria.   Genitourinary: Negative.    Musculoskeletal:  Positive for neck pain and neck stiffness. Negative for gait problem.   Skin: Negative.    Neurological:  Positive for light-headedness. Negative for dizziness, seizures, syncope, speech difficulty and numbness.   Hematological: Negative.    Psychiatric/Behavioral: Negative.         BP Readings from Last 5 Encounters:   11/07/24 128/70   09/23/20 118/80   08/14/20 131/80   02/05/20 155/84   10/17/19 170/95       Wt Readings from Last 5 Encounters:   11/07/24 88.6 kg (195 lb 6.4 oz)   09/23/20 85.7 kg (189 lb)   08/28/20 86.2 kg (190 lb)   08/13/20 86.6 kg (191 lb)   08/10/20 86.7 kg (191 lb 2.2 oz)          Objective     /70 (BP Location: Right arm, Patient Position: Sitting, Cuff Size: Adult)   Pulse 67   Ht 175.3 cm (69\")   Wt 88.6 kg (195 lb 6.4 oz)   BMI 28.86 kg/m²     Physical Exam  Vitals and nursing note reviewed.   Constitutional:       Appearance: She is well-groomed.   HENT:      Head: Normocephalic.      Nose: Nose normal.      Mouth/Throat:      Pharynx: Oropharynx is clear.   Eyes:      Conjunctiva/sclera: Conjunctivae normal.      Pupils: Pupils are equal, round, and reactive to light.   Cardiovascular:      Rate and Rhythm: Normal rate and regular rhythm.      Pulses: Normal pulses.      Heart sounds: Normal heart sounds.   Abdominal:      General: Bowel sounds are normal. There is no distension.      Palpations: Abdomen is soft.      Tenderness: There is no abdominal tenderness.   Musculoskeletal:      Right lower leg: No edema.      Left lower leg: No edema.   Skin:     General: Skin is warm and dry.      Coloration: Skin is not jaundiced or pale.   Neurological:      Mental Status: She is alert and oriented to person, place, and time.   Psychiatric:         Mood and Affect: Mood normal.         Behavior: Behavior normal. Behavior is cooperative. "           ECG 12 Lead    Date/Time: 11/7/2024 11:04 AM  Performed by: Jailyn Johns APRN    Authorized by: Jailyn Johns APRN  Comparison: not compared with previous ECG   Rhythm: sinus rhythm  Rate: normal  BPM: 97          Assessment & Plan     Diagnoses and all orders for this visit:    1. Other chest pain (Primary)  -     Treadmill Stress Test; Future  -     Adult Transthoracic Echo Complete W/ Cont if Necessary Per Protocol; Future  -     ECG 12 Lead    2. Shortness of breath  -     Treadmill Stress Test; Future  -     Adult Transthoracic Echo Complete W/ Cont if Necessary Per Protocol; Future    3. Palpitations  -     Treadmill Stress Test; Future  -     Adult Transthoracic Echo Complete W/ Cont if Necessary Per Protocol; Future  -     ECG 12 Lead    4. Hypercholesterolemia      Chest pain/SOA  -No known prior cardiac disease.  Patient has risk including family history, hypercholesterolemia, overweight.  -Exercise treadmill stress test echocardiogram ordered to assess cardiac status and look for ischemic cause of symptoms.    Palpitations  -EKG NSR  -Informational handout on palpitations and triggers to avoid provided  -If cardiac workup negative and palpitations persist then consider cardiac monitor  -Recent labs shows normal electrolytes and thyroid function    Hypercholesterolemia  -Recent labs shows   -Information on Mediterranean diet provided  -Routine low impact exercise encouraged  -Goal BMI of less than 25 encouraged    Further recommendations based on exercise stress test echocardiogram    6-month follow-up visit scheduled.           Electronically signed by SOLITARIO Victoria,  November 7, 2024 11:12 EST    Dictated Utilizing Dragon Dictation: Part of this note may be an electronic transcription/translation of spoken language to printed text using the Dragon Dictation System.

## 2024-12-05 ENCOUNTER — TELEPHONE (OUTPATIENT)
Dept: CARDIOLOGY | Facility: CLINIC | Age: 49
End: 2024-12-05
Payer: COMMERCIAL

## 2024-12-05 NOTE — TELEPHONE ENCOUNTER
LEAVING THIS TE IN CASE BRAND[facility] IS BUSY. ADRIANO FROM  [employer] REPORTS THEY HAVE BEEN TRYING TO GET IN TOUCH WITH [facility], PLEASE CALL BACK AT EARLIEST CONVENIENCE AT THE BILLING NUMBER.

## 2024-12-06 ENCOUNTER — APPOINTMENT (OUTPATIENT)
Dept: CARDIOLOGY | Facility: HOSPITAL | Age: 49
End: 2024-12-06
Payer: COMMERCIAL

## 2024-12-06 ENCOUNTER — HOSPITAL ENCOUNTER (OUTPATIENT)
Dept: CARDIOLOGY | Facility: HOSPITAL | Age: 49
Discharge: HOME | End: 2024-12-06
Payer: COMMERCIAL

## 2024-12-06 DIAGNOSIS — R07.89 OTHER CHEST PAIN: ICD-10-CM

## 2024-12-06 DIAGNOSIS — R06.02 SHORTNESS OF BREATH: ICD-10-CM

## 2024-12-06 DIAGNOSIS — R00.2 PALPITATIONS: ICD-10-CM

## 2024-12-06 PROCEDURE — 93017 CV STRESS TEST TRACING ONLY: CPT

## 2024-12-08 LAB
BH CV STRESS DURATION MIN STAGE 1: 3
BH CV STRESS DURATION SEC STAGE 1: 0
BH CV STRESS GRADE STAGE 1: 10
BH CV STRESS METS STAGE 1: 5
BH CV STRESS PROTOCOL 1: NORMAL
BH CV STRESS RECOVERY BP: NORMAL MMHG
BH CV STRESS RECOVERY HR: 88 BPM
BH CV STRESS SPEED STAGE 1: 1.7
BH CV STRESS STAGE 1: 1
MAXIMAL PREDICTED HEART RATE: 172 BPM
PERCENT MAX PREDICTED HR: 90.12 %
STRESS BASELINE BP: NORMAL MMHG
STRESS BASELINE HR: 71 BPM
STRESS PERCENT HR: 106 %
STRESS POST ESTIMATED WORKLOAD: 9.6 METS
STRESS POST EXERCISE DUR MIN: 6 MIN
STRESS POST EXERCISE DUR SEC: 53 SEC
STRESS POST PEAK BP: NORMAL MMHG
STRESS POST PEAK HR: 155 BPM
STRESS TARGET HR: 146 BPM

## 2024-12-10 RX ORDER — METOPROLOL SUCCINATE 25 MG/1
25 TABLET, EXTENDED RELEASE ORAL DAILY
Qty: 30 TABLET | Refills: 11 | Status: SHIPPED | OUTPATIENT
Start: 2024-12-10

## 2024-12-18 ENCOUNTER — OFFICE VISIT (OUTPATIENT)
Dept: NEUROSURGERY | Facility: CLINIC | Age: 49
End: 2024-12-18
Payer: COMMERCIAL

## 2024-12-18 VITALS — WEIGHT: 193.8 LBS | TEMPERATURE: 97.7 F | BODY MASS INDEX: 28.71 KG/M2 | HEIGHT: 69 IN

## 2024-12-18 DIAGNOSIS — Z98.1 S/P CERVICAL SPINAL FUSION: Primary | ICD-10-CM

## 2024-12-18 RX ORDER — DIPHENOXYLATE HYDROCHLORIDE AND ATROPINE SULFATE 2.5; .025 MG/1; MG/1
1 TABLET ORAL DAILY
COMMUNITY

## 2024-12-18 RX ORDER — ESTRADIOL 0.03 MG/D
PATCH TRANSDERMAL
COMMUNITY
Start: 2024-12-12

## 2024-12-18 RX ORDER — TRIAMCINOLONE ACETONIDE 1 MG/G
CREAM TOPICAL
COMMUNITY

## 2024-12-18 NOTE — PROGRESS NOTES
Subjective     Chief Complaint: Neck pain following multilevel ACDF    Patient ID: Yuni Cowan is a 49 y.o. female seen for consultation today at the request of  SOLITARIO Brennan    Neck Pain         This is a 49-year-old woman in whom I performed a two-level ACDF about 4 years ago.  She did well up until about 2-3 months ago when she began to experience the recurrence of right sided neck pain.  She also endorses radiating pain into her right shoulder blade as well as numbness and tingling in both hands.  She is tried physical therapy without relief.    The following portions of the patient's history were reviewed and updated as appropriate: allergies, current medications, past family history, past medical history, past social history, past surgical history and problem list.    Family history:   Family History   Problem Relation Age of Onset    Asthma Mother     Diabetes Mother     Hyperlipidemia Father     Heart disease Father     Hypertension Father     Arthritis Father     Cancer Father     Diabetes Brother     Liver disease Maternal Grandmother     Kidney disease Maternal Grandfather     Heart disease Paternal Grandmother     Kidney disease Paternal Grandfather     Diabetes Son     Anxiety disorder Son        Social history:   Social History     Socioeconomic History    Marital status:    Tobacco Use    Smoking status: Former     Current packs/day: 0.00     Average packs/day: 1 pack/day for 17.0 years (17.0 ttl pk-yrs)     Types: Cigarettes     Start date: 1993     Quit date: 2011     Years since quittin.4    Smokeless tobacco: Never   Vaping Use    Vaping status: Never Used   Substance and Sexual Activity    Alcohol use: No    Drug use: No    Sexual activity: Yes     Partners: Male     Birth control/protection: Hysterectomy       Review of Systems   Musculoskeletal:  Positive for neck pain.       Objective   Temperature 97.7 °F (36.5 °C), temperature source Temporal, height 175.3 cm  "(69\"), weight 87.9 kg (193 lb 12.8 oz).  Body mass index is 28.62 kg/m².    Physical Exam  Constitutional:       General: She is not in acute distress.     Appearance: She is well-developed. She is not diaphoretic.   HENT:      Head: Normocephalic and atraumatic.   Pulmonary:      Effort: Pulmonary effort is normal.   Musculoskeletal:      Comments: Reduced range of motion of the cervical spine particularly with rotation and lateral bending.  Lhermitte sign and Spurling sign are both absent.  No focal motor weakness proximally or distally bilateral upper extremities.   Skin:     General: Skin is warm and dry.   Neurological:      Mental Status: She is alert and oriented to person, place, and time.      Cranial Nerves: No cranial nerve deficit.         Assessment & Plan     Independent Review of Radiographic Studies:      Available for my review is a MRI of the cervical spine which was performed on 12/2/2024.  There is susceptibility artifact within the vertebral bodies at C5, C6, and C7.  There is some spurring at C4-5 which is slightly eccentric to the right as compared to the left, however I do not appreciate any obvious radiographic evidence of nerve root impingement.  Cervical lordosis is largely preserved.  C7-T1 is unremarkable.    Medical Decision Making:      This is a 49-year-old woman with right sided neck pain for years out from an uncomplicated C5-7 ACDF.  I do not appreciate any obvious structural etiology for her pain on the basis of her MRI.  I have ordered a cervical myelogram and referred her for pain management.  I will follow-up with her when she has had an injection in her myelogram is been completed.  I think the risk of morbidity at this point without intervention is low.    Diagnoses and all orders for this visit:    1. S/P cervical spinal fusion (Primary)  -     Ambulatory Referral to Pain Management  -     Case Request Cath Lab: IR myelogram cervical spine; Standing  -     Case Request Cath " Lab: IR myelogram cervical spine        No follow-ups on file.           This document signed by BETTY Gu MD December 18, 2024 13:06 EST

## 2024-12-23 ENCOUNTER — TELEPHONE (OUTPATIENT)
Dept: CARDIOLOGY | Facility: CLINIC | Age: 49
End: 2024-12-23
Payer: COMMERCIAL

## 2024-12-23 NOTE — TELEPHONE ENCOUNTER
Hub staff attempted to follow warm transfer process and was unsuccessful     Caller: Yuni Cowan    Relationship to patient: Self    Best call back number: [phone]     Patient is needing: PT HAS TESTING COMING UP ON 12/30 BUT WAS CALLED AND TOLD THIS WAS THE ONLY DAY THEY COULD DO HER SURG. NEEDS TO RESCHD. UNABLE TO WT. PLEASE ADVISE.

## 2024-12-30 ENCOUNTER — APPOINTMENT (OUTPATIENT)
Dept: CT IMAGING | Facility: HOSPITAL | Age: 49
End: 2024-12-30
Payer: COMMERCIAL

## 2024-12-30 ENCOUNTER — HOSPITAL ENCOUNTER (OUTPATIENT)
Facility: HOSPITAL | Age: 49
Setting detail: HOSPITAL OUTPATIENT SURGERY
Discharge: HOME | End: 2024-12-30
Attending: RADIOLOGY | Admitting: NEUROLOGICAL SURGERY
Payer: COMMERCIAL

## 2024-12-30 VITALS
HEART RATE: 64 BPM | TEMPERATURE: 97 F | WEIGHT: 196.4 LBS | SYSTOLIC BLOOD PRESSURE: 126 MMHG | DIASTOLIC BLOOD PRESSURE: 78 MMHG | BODY MASS INDEX: 29.09 KG/M2 | RESPIRATION RATE: 14 BRPM | OXYGEN SATURATION: 99 % | HEIGHT: 69 IN

## 2024-12-30 DIAGNOSIS — Z98.1 S/P CERVICAL SPINAL FUSION: ICD-10-CM

## 2024-12-30 PROCEDURE — 25510000001 IOPAMIDOL PER 1 ML: Performed by: RADIOLOGY

## 2024-12-30 PROCEDURE — 72240 MYELOGRAPHY NECK SPINE: CPT | Performed by: RADIOLOGY

## 2024-12-30 PROCEDURE — 25010000002 LIDOCAINE PF 1% 1 % SOLUTION: Performed by: RADIOLOGY

## 2024-12-30 PROCEDURE — 72126 CT NECK SPINE W/DYE: CPT

## 2024-12-30 RX ORDER — LIDOCAINE HYDROCHLORIDE 10 MG/ML
INJECTION, SOLUTION EPIDURAL; INFILTRATION; INTRACAUDAL; PERINEURAL
Status: DISCONTINUED | OUTPATIENT
Start: 2024-12-30 | End: 2024-12-30 | Stop reason: HOSPADM

## 2024-12-30 RX ORDER — IOPAMIDOL 510 MG/ML
INJECTION, SOLUTION INTRAVASCULAR
Status: DISCONTINUED | OUTPATIENT
Start: 2024-12-30 | End: 2024-12-30 | Stop reason: HOSPADM

## 2025-01-08 ENCOUNTER — OFFICE VISIT (OUTPATIENT)
Dept: NEUROSURGERY | Facility: CLINIC | Age: 50
End: 2025-01-08
Payer: COMMERCIAL

## 2025-01-08 VITALS — HEIGHT: 68 IN | BODY MASS INDEX: 29.7 KG/M2 | WEIGHT: 196 LBS

## 2025-01-08 DIAGNOSIS — Z98.1 S/P CERVICAL SPINAL FUSION: ICD-10-CM

## 2025-01-08 DIAGNOSIS — M54.2 NECK PAIN: Primary | ICD-10-CM

## 2025-01-08 PROCEDURE — 99213 OFFICE O/P EST LOW 20 MIN: CPT | Performed by: NEUROLOGICAL SURGERY

## 2025-01-08 NOTE — PROGRESS NOTES
"Subjective     Chief Complaint: Right-sided neck and shoulder pain    Patient ID: Yuni Cowan is a 49 y.o. female is here today for follow-up.    History of Present Illness    This is a 49-year-old woman in whom I performed a two-level ACDF about 4 years ago.  She re- presented to my office with right sided neck and shoulder pain.  She had an unremarkable MRI so ordered a cervical myelogram.  She presents today to discuss the results of the study.  She reports no appreciable change in her symptoms since she was last seen on 2024.    The following portions of the patient's history were reviewed and updated as appropriate: allergies, current medications, past family history, past medical history, past social history, past surgical history and problem list.    Family history:   Family History   Problem Relation Age of Onset    Asthma Mother     Diabetes Mother     Hyperlipidemia Father     Heart disease Father     Hypertension Father     Arthritis Father     Cancer Father     Diabetes Brother     Liver disease Maternal Grandmother     Kidney disease Maternal Grandfather     Heart disease Paternal Grandmother     Kidney disease Paternal Grandfather     Diabetes Son     Anxiety disorder Son        Social history:   Social History     Socioeconomic History    Marital status:    Tobacco Use    Smoking status: Former     Current packs/day: 0.00     Average packs/day: 1 pack/day for 17.0 years (17.0 ttl pk-yrs)     Types: Cigarettes     Start date: 1993     Quit date: 2011     Years since quittin.5    Smokeless tobacco: Never   Vaping Use    Vaping status: Never Used   Substance and Sexual Activity    Alcohol use: No    Drug use: No    Sexual activity: Yes     Partners: Male     Birth control/protection: Hysterectomy       Review of Systems   All other systems reviewed and are negative.      Objective   Height 172.7 cm (68\"), weight 88.9 kg (196 lb).  Body mass index is 29.8 kg/m².    Physical " Exam    Assessment & Plan     Independent Review of Radiographic Studies:      Her cervical myelogram is unremarkable.  There is perhaps some mild displacement of the right C5 nerve root, however I do not appreciate any obvious listhesis, inducible instability, or objective radiographic evidence of cervical nerve root impingement.    Medical Decision Making:      Given the benign nature of her MRI and cervical myelogram, There is no role for surgical intervention at this point.  I did review the signs and symptoms of cervical radiculopathy and cervical myelopathy with her.  I directed her to contact my office with new or worsening symptoms.  My recommendation is for continued exercise and physical therapy.  I would be happy to follow-up with her on an as-needed basis moving forward.    Diagnoses and all orders for this visit:    1. Neck pain (Primary)    2. S/P cervical spinal fusion        No follow-ups on file.           This document signed by BETTY Gu MD January 8, 2025 11:06 EST

## 2025-05-20 ENCOUNTER — OFFICE VISIT (OUTPATIENT)
Dept: CARDIOLOGY | Facility: CLINIC | Age: 50
End: 2025-05-20
Payer: COMMERCIAL

## 2025-05-20 VITALS
HEIGHT: 69 IN | BODY MASS INDEX: 30.07 KG/M2 | DIASTOLIC BLOOD PRESSURE: 70 MMHG | WEIGHT: 203 LBS | HEART RATE: 68 BPM | SYSTOLIC BLOOD PRESSURE: 112 MMHG

## 2025-05-20 DIAGNOSIS — R06.02 SHORTNESS OF BREATH: ICD-10-CM

## 2025-05-20 DIAGNOSIS — R60.0 BILATERAL LEG EDEMA: ICD-10-CM

## 2025-05-20 DIAGNOSIS — R07.9 CHEST PAIN, UNSPECIFIED TYPE: Primary | ICD-10-CM

## 2025-05-20 DIAGNOSIS — Z98.1 S/P CERVICAL SPINAL FUSION: ICD-10-CM

## 2025-05-20 PROCEDURE — 99214 OFFICE O/P EST MOD 30 MIN: CPT | Performed by: NURSE PRACTITIONER

## 2025-05-20 RX ORDER — IVABRADINE 5 MG/1
15 TABLET, FILM COATED ORAL ONCE
OUTPATIENT
Start: 2025-05-20 | End: 2025-05-20

## 2025-05-20 RX ORDER — METOPROLOL TARTRATE 25 MG/1
100 TABLET, FILM COATED ORAL ONCE
OUTPATIENT
Start: 2025-05-20

## 2025-05-20 RX ORDER — SODIUM CHLORIDE 9 MG/ML
40 INJECTION, SOLUTION INTRAVENOUS AS NEEDED
OUTPATIENT
Start: 2025-05-20

## 2025-05-20 RX ORDER — METOPROLOL TARTRATE 25 MG/1
50 TABLET, FILM COATED ORAL ONCE
OUTPATIENT
Start: 2025-05-20

## 2025-05-20 RX ORDER — SODIUM CHLORIDE 0.9 % (FLUSH) 0.9 %
10 SYRINGE (ML) INJECTION AS NEEDED
OUTPATIENT
Start: 2025-05-20

## 2025-05-20 RX ORDER — SODIUM CHLORIDE 0.9 % (FLUSH) 0.9 %
10 SYRINGE (ML) INJECTION EVERY 12 HOURS SCHEDULED
OUTPATIENT
Start: 2025-05-20

## 2025-05-20 RX ORDER — FUROSEMIDE 20 MG/1
20 TABLET ORAL DAILY PRN
Qty: 90 TABLET | Refills: 3 | Status: SHIPPED | OUTPATIENT
Start: 2025-05-20

## 2025-05-20 RX ORDER — NITROGLYCERIN 0.4 MG/1
0.4 TABLET SUBLINGUAL
OUTPATIENT
Start: 2025-05-20 | End: 2025-05-20

## 2025-05-20 RX ORDER — METOPROLOL TARTRATE 25 MG/1
150 TABLET, FILM COATED ORAL ONCE
OUTPATIENT
Start: 2025-05-20

## 2025-05-20 RX ORDER — METOPROLOL TARTRATE 1 MG/ML
5 INJECTION, SOLUTION INTRAVENOUS
OUTPATIENT
Start: 2025-05-20

## 2025-05-20 RX ORDER — METOPROLOL TARTRATE 50 MG
50 TABLET ORAL
OUTPATIENT
Start: 2025-05-20

## 2025-05-20 RX ORDER — METOPROLOL TARTRATE 50 MG
TABLET ORAL
Qty: 2 TABLET | Refills: 0 | Status: SHIPPED | OUTPATIENT
Start: 2025-05-20

## 2025-05-20 RX ORDER — NITROGLYCERIN 0.4 MG/1
0.8 TABLET SUBLINGUAL
OUTPATIENT
Start: 2025-05-20

## 2025-05-20 RX ORDER — METOPROLOL SUCCINATE 25 MG/1
25 TABLET, EXTENDED RELEASE ORAL DAILY
Qty: 30 TABLET | Refills: 11 | Status: SHIPPED | OUTPATIENT
Start: 2025-05-20

## 2025-05-20 RX ORDER — METOPROLOL TARTRATE 25 MG/1
200 TABLET, FILM COATED ORAL ONCE
OUTPATIENT
Start: 2025-05-20 | End: 2025-05-20

## 2025-05-20 NOTE — PROGRESS NOTES
Chief Complaint   Patient presents with    Follow-up     Cardiac management    Lab     Last labs 08/2024, see chart.    Chest Pain     Notices intermittent chest tightness in chest and heaviness in chest, lasting a few hours.    Shortness of Breath     Notices with exertion, feels could be related to weight and conditioning.    Edema     Notices swelling in legs and ankles daily.     Med Refill     Will need refills on Metoprolol-90 day        HPI:  HPI   Yuni Cowan is a 49 y.o. female who established care 11/2024 with primary concerns of chest tightness, increase shortness of breath, and heart palpitations.  She has a history of cervical disc disease s/p anterior cervical dissection and fusion with no diagnosed cardiovascular disease.  The symptoms occurred when under stress or with activity.  Palpitations were fluttering in nature and last 20 to 30 seconds.  At times palpitations are associated with ears feeling full and lightheadedness. Patient admitts to limited caffeine, occasional cola and is a non-smoker. H significant for father having heart disease and patient at our office.  Mother has DM.  Son has cardiac murmur and diabetes    She returns today for follow-up visit.  Stress test 12/2024 borderline with hypertensive blood pressure response.  Toprol 25 mg was started and CTA recommended if chest pain continued. She reports episodes of chest tightness and pressure. She is having BLE edema after working all day.  She admits to shortness of breath with exertion.    Cardiac History:    Past Surgical History:   Procedure Laterality Date    ANTERIOR CERVICAL DISCECTOMY W/ FUSION N/A 08/13/2020    Procedure: anterior cervical discectomy and fusion C5-7;  Surgeon: Felipe Gu MD;  Location: [facility] OR;  Service: Neurosurgery;  Laterality: N/A;    CERVICAL EPIDURAL N/A 02/05/2020    Procedure: CERVICAL EPIDURAL;  Surgeon: Aman Chatterjee DO;  Location: [facility] OR;  Service: Pain Management;   Laterality: N/A;    ENDOSCOPY      EXERCISE STRESS - CONVERTED  12/06/2024    R.Stress- 6.53 Min.9.6 METS. 90% THR. 197/69. Borderline test    INTERVENTIONAL RADIOLOGY PROCEDURE N/A 12/30/2024    Procedure: IR myelogram cervical spine;  Surgeon: Jose Amado MD;  Location:  [facility];  Service: Interventional Radiology;  Laterality: N/A;    NECK SURGERY  2020    Performed by this office    VT MYELOGRAPHY VIA LUMBAR INJECTION RS&I CERVICAL N/A 10/09/2019    Procedure: IR myelogram, cervical;  Surgeon: Jose Amado MD;  Location:  [facility];  Service: Interventional Radiology    TONSILLECTOMY      TUBAL ABDOMINAL LIGATION      WISDOM TOOTH EXTRACTION         Current Outpatient Medications   Medication Sig Dispense Refill    ibuprofen (ADVIL,MOTRIN) 200 MG tablet Take 1 tablet by mouth As Needed for Mild Pain.      metoprolol succinate XL (TOPROL-XL) 25 MG 24 hr tablet Take 1 tablet by mouth Daily. 30 tablet 11    multivitamin tablet tablet Take 1 tablet by mouth Daily.      triamcinolone (KENALOG) 0.1 % cream As Needed.      Vitamin D, Cholecalciferol, 50 MCG (2000 UT) capsule Take 1 capsule by mouth Daily.      furosemide (LASIX) 20 MG tablet Take 1 tablet by mouth Daily As Needed (for swelling). 90 tablet 3    metoprolol tartrate (LOPRESSOR) 50 MG tablet Take 50 mg at Bedtime the Night Before Coronary CTA Appointment and In the Morning 1 Hour Prior to Coronary CTA Appointment. Do not take if heart rate less than 60. 2 tablet 0     No current facility-administered medications for this visit.       Patient has no known allergies.    Past Medical History:   Diagnosis Date    Abnormal ECG 2024    I had a stress test. I have high blood pressure    Anemia     Arthritis     Cervical disc disorder 2024    Having pains and neck area and down both arms    Headache     Hypertension     Low back pain 2019    Started before previous surgery    Sleep apnea     unable to tolerate CPAP  "   Thoracic disc disorder     Middle of back stays sore often    Vitamin D deficiency        Social History     Socioeconomic History    Marital status:    Tobacco Use    Smoking status: Former     Current packs/day: 0.00     Average packs/day: 1 pack/day for 17.0 years (17.0 ttl pk-yrs)     Types: Cigarettes     Start date: 1993     Quit date: 2011     Years since quittin.8    Smokeless tobacco: Never   Vaping Use    Vaping status: Never Used   Substance and Sexual Activity    Alcohol use: No    Drug use: No    Sexual activity: Yes     Partners: Male     Birth control/protection: Hysterectomy       Family History   Problem Relation Age of Onset    Asthma Mother     Diabetes Mother     Hyperlipidemia Father     Heart disease Father     Hypertension Father     Arthritis Father     Cancer Father     Diabetes Brother     Liver disease Maternal Grandmother     Kidney disease Maternal Grandfather     Heart disease Paternal Grandmother     Kidney disease Paternal Grandfather     Diabetes Son     Anxiety disorder Son        Vitals:   /70 (BP Location: Left arm)   Pulse 68   Ht 175.3 cm (69.02\")   Wt 92.1 kg (203 lb)   BMI 29.96 kg/m²     Physical Exam  Vitals and nursing note reviewed.   Constitutional:       Appearance: She is overweight.   Neck:      Vascular: No carotid bruit.   Cardiovascular:      Rate and Rhythm: Normal rate and regular rhythm.      Pulses: Normal pulses.      Heart sounds: Normal heart sounds. No murmur heard.     No friction rub. No gallop.   Pulmonary:      Effort: Pulmonary effort is normal.      Breath sounds: Normal breath sounds and air entry.   Musculoskeletal:      Right lower leg: No edema.      Left lower leg: No edema.   Skin:     General: Skin is warm and dry.      Capillary Refill: Capillary refill takes less than 2 seconds.   Neurological:      Mental Status: She is alert and oriented to person, place, and time.       Procedures     Assessment & Plan "     Diagnoses and all orders for this visit:    1. Chest pain, unspecified type (Primary)  -     CT Angiogram Coronary; Future  -     CT Angiogram Coronary-Cardiology Interpretation; Future  -     metoprolol tartrate (LOPRESSOR) tablet 200 mg  -     metoprolol tartrate (LOPRESSOR) tablet 150 mg  -     metoprolol tartrate (LOPRESSOR) tablet 100 mg  -     metoprolol tartrate (LOPRESSOR) tablet 50 mg  -     metoprolol tartrate (LOPRESSOR) tablet 50 mg  -     metoprolol tartrate (LOPRESSOR) injection 5 mg  -     nitroglycerin (NITROSTAT) SL tablet 0.4 mg  -     nitroglycerin (NITROSTAT) SL tablet 0.8 mg  -     ivabradine HCl (CORLANOR) tablet 15 mg  -     No Caffeine or Nicotine 4 Hours Prior to CTA Appointment  -     Nothing to Eat or Drink 4 Hours Prior to CTA Appointment  -     Do Not Take Phosphodiasterase Inhibitors in the 72 Hours Prior to Coronary CTA  -     Obtain Informed Consent - Computed Tomography Angiography of Chest - Angiogram of Coronary Arteries; Standing  -     Vital Signs Upon Arrival; Standing  -     Cardiac Monitoring; Standing  -     Verify NPO Status - Patient to Be NPO at Least 4 Hours Prior to CTA; Standing  -     Notify CT After Administration of metoprolol tartrate (LOPRESSOR) tablet; Standing  -     Notify Provider If Total Metoprolol Given Equals 300mg & Heart Rate Not At Goal; Standing  -     Notify Provider Prior to Administration of Nitroglycerin if Patient SBP <80; Standing  -     POC Creatinine; Standing  -     Insert Peripheral IV; Standing  -     Saline Lock & Maintain IV Access; Standing  -     sodium chloride 0.9 % flush 10 mL  -     sodium chloride 0.9 % flush 10 mL  -     sodium chloride 0.9 % infusion 40 mL  -     Vital Signs - See Instructions; Standing  -     Hold Medication Metformin (Glucophage, Glucophage XR, Fortament, Glumetza);  Metglip (metformin/glipizide);  Glucovance (metformin/glyburide); Avandamet (metformin/rosiglitazone); Standing  -     Patient May Discharge  Home After Procedure Complete (If Stable); Standing  -     metoprolol tartrate (LOPRESSOR) 50 MG tablet; Take 50 mg at Bedtime the Night Before Coronary CTA Appointment and In the Morning 1 Hour Prior to Coronary CTA Appointment. Do not take if heart rate less than 60.  Dispense: 2 tablet; Refill: 0    2. Shortness of breath  -     CT Angiogram Coronary; Future  -     CT Angiogram Coronary-Cardiology Interpretation; Future  -     metoprolol tartrate (LOPRESSOR) tablet 200 mg  -     metoprolol tartrate (LOPRESSOR) tablet 150 mg  -     metoprolol tartrate (LOPRESSOR) tablet 100 mg  -     metoprolol tartrate (LOPRESSOR) tablet 50 mg  -     metoprolol tartrate (LOPRESSOR) tablet 50 mg  -     metoprolol tartrate (LOPRESSOR) injection 5 mg  -     nitroglycerin (NITROSTAT) SL tablet 0.4 mg  -     nitroglycerin (NITROSTAT) SL tablet 0.8 mg  -     ivabradine HCl (CORLANOR) tablet 15 mg  -     No Caffeine or Nicotine 4 Hours Prior to CTA Appointment  -     Nothing to Eat or Drink 4 Hours Prior to CTA Appointment  -     Do Not Take Phosphodiasterase Inhibitors in the 72 Hours Prior to Coronary CTA  -     Obtain Informed Consent - Computed Tomography Angiography of Chest - Angiogram of Coronary Arteries; Standing  -     Vital Signs Upon Arrival; Standing  -     Cardiac Monitoring; Standing  -     Verify NPO Status - Patient to Be NPO at Least 4 Hours Prior to CTA; Standing  -     Notify CT After Administration of metoprolol tartrate (LOPRESSOR) tablet; Standing  -     Notify Provider If Total Metoprolol Given Equals 300mg & Heart Rate Not At Goal; Standing  -     Notify Provider Prior to Administration of Nitroglycerin if Patient SBP <80; Standing  -     POC Creatinine; Standing  -     Insert Peripheral IV; Standing  -     Saline Lock & Maintain IV Access; Standing  -     sodium chloride 0.9 % flush 10 mL  -     sodium chloride 0.9 % flush 10 mL  -     sodium chloride 0.9 % infusion 40 mL  -     Vital Signs - See Instructions;  Standing  -     Hold Medication Metformin (Glucophage, Glucophage XR, Fortament, Glumetza);  Metglip (metformin/glipizide);  Glucovance (metformin/glyburide); Avandamet (metformin/rosiglitazone); Standing  -     Patient May Discharge Home After Procedure Complete (If Stable); Standing  -     metoprolol tartrate (LOPRESSOR) 50 MG tablet; Take 50 mg at Bedtime the Night Before Coronary CTA Appointment and In the Morning 1 Hour Prior to Coronary CTA Appointment. Do not take if heart rate less than 60.  Dispense: 2 tablet; Refill: 0    3. S/P cervical spinal fusion    4. Bilateral leg edema  -     furosemide (LASIX) 20 MG tablet; Take 1 tablet by mouth Daily As Needed (for swelling).  Dispense: 90 tablet; Refill: 3    Other orders  -     metoprolol succinate XL (TOPROL-XL) 25 MG 24 hr tablet; Take 1 tablet by mouth Daily.  Dispense: 30 tablet; Refill: 11    Chest pain/shortness of breath  - Recommend CTA as stress test 12/2024 was borderline and she continues to have symptoms  - Treadmill stress test 12/2024 increased chronotropic response and hypertensive blood pressure response borderline for stress-induced ischemia    BLE edema  - Recommend compression hose and leg elevation  - Ordered Lasix as needed for swelling    CTA ordered. Refill Toprol. Lasix PRN     Visit Diagnoses:    ICD-10-CM ICD-9-CM   1. Chest pain, unspecified type  R07.9 786.50   2. Shortness of breath  R06.02 786.05   3. S/P cervical spinal fusion  Z98.1 V45.4   4. Bilateral leg edema  R60.0 782.3       Meds Ordered During Visit:  New Medications Ordered This Visit   Medications    metoprolol tartrate (LOPRESSOR) 50 MG tablet     Sig: Take 50 mg at Bedtime the Night Before Coronary CTA Appointment and In the Morning 1 Hour Prior to Coronary CTA Appointment. Do not take if heart rate less than 60.     Dispense:  2 tablet     Refill:  0    furosemide (LASIX) 20 MG tablet     Sig: Take 1 tablet by mouth Daily As Needed (for swelling).     Dispense:  90  tablet     Refill:  3    metoprolol succinate XL (TOPROL-XL) 25 MG 24 hr tablet     Sig: Take 1 tablet by mouth Daily.     Dispense:  30 tablet     Refill:  11       Follow Up Appointment:   Return in about 6 months (around 11/20/2025), or if symptoms worsen or fail to improve.           This document has been electronically signed by SOLITARIO Cleveland  May 20, 2025 10:22 EDT    Dictated Utilizing Dragon Dictation: Part of this note may be an electronic transcription/translation of spoken language to printed text using the Dragon Dictation System.

## 2025-07-03 ENCOUNTER — TELEPHONE (OUTPATIENT)
Dept: INFUSION THERAPY | Facility: HOSPITAL | Age: 50
End: 2025-07-03
Payer: COMMERCIAL

## 2025-07-03 NOTE — TELEPHONE ENCOUNTER
Pt contacted as pre-procedure phone call prior to planned CTA coronary for 7/7/2025. Reviewed with patient arrival time between 7:45 to main registration, nothing to eat or drink by mouth 2 hours prior to arrival, no caffeine after midnight, please take premedications night before and morning of procedure with a small sip of water as instructed,  recommended,  reviewed procedure instructions and allowed time for questions, and reviewed home medications, allergies, and medical history.

## 2025-07-07 ENCOUNTER — HOSPITAL ENCOUNTER (OUTPATIENT)
Dept: CT IMAGING | Facility: HOSPITAL | Age: 50
Discharge: HOME | End: 2025-07-07
Payer: COMMERCIAL

## 2025-07-07 VITALS
WEIGHT: 206.4 LBS | DIASTOLIC BLOOD PRESSURE: 83 MMHG | HEART RATE: 61 BPM | OXYGEN SATURATION: 97 % | BODY MASS INDEX: 30.47 KG/M2 | RESPIRATION RATE: 18 BRPM | SYSTOLIC BLOOD PRESSURE: 120 MMHG

## 2025-07-07 DIAGNOSIS — R06.02 SHORTNESS OF BREATH: ICD-10-CM

## 2025-07-07 DIAGNOSIS — R07.9 CHEST PAIN, UNSPECIFIED TYPE: ICD-10-CM

## 2025-07-07 PROCEDURE — 25510000001 IOPAMIDOL PER 1 ML: Performed by: NURSE PRACTITIONER

## 2025-07-07 PROCEDURE — 75574 CT ANGIO HRT W/3D IMAGE: CPT

## 2025-07-07 PROCEDURE — 75574 CT ANGIO HRT W/3D IMAGE: CPT | Performed by: INTERNAL MEDICINE

## 2025-07-07 RX ORDER — SODIUM CHLORIDE 0.9 % (FLUSH) 0.9 %
10 SYRINGE (ML) INJECTION EVERY 12 HOURS SCHEDULED
Status: DISCONTINUED | OUTPATIENT
Start: 2025-07-07 | End: 2025-07-08 | Stop reason: HOSPADM

## 2025-07-07 RX ORDER — SODIUM CHLORIDE 0.9 % (FLUSH) 0.9 %
10 SYRINGE (ML) INJECTION AS NEEDED
Status: DISCONTINUED | OUTPATIENT
Start: 2025-07-07 | End: 2025-07-08 | Stop reason: HOSPADM

## 2025-07-07 RX ORDER — IOPAMIDOL 755 MG/ML
100 INJECTION, SOLUTION INTRAVASCULAR
Status: COMPLETED | OUTPATIENT
Start: 2025-07-07 | End: 2025-07-07

## 2025-07-07 RX ORDER — NITROGLYCERIN 0.4 MG/1
0.4 TABLET SUBLINGUAL
Status: COMPLETED | OUTPATIENT
Start: 2025-07-07 | End: 2025-07-07

## 2025-07-07 RX ORDER — IVABRADINE 5 MG/1
15 TABLET, FILM COATED ORAL ONCE
Status: DISCONTINUED | OUTPATIENT
Start: 2025-07-07 | End: 2025-07-08 | Stop reason: HOSPADM

## 2025-07-07 RX ORDER — NITROGLYCERIN 0.4 MG/1
0.8 TABLET SUBLINGUAL
Status: COMPLETED | OUTPATIENT
Start: 2025-07-07 | End: 2025-07-07

## 2025-07-07 RX ORDER — METOPROLOL TARTRATE 100 MG/1
100 TABLET ORAL ONCE
Status: DISCONTINUED | OUTPATIENT
Start: 2025-07-07 | End: 2025-07-08 | Stop reason: HOSPADM

## 2025-07-07 RX ORDER — METOPROLOL TARTRATE 100 MG/1
200 TABLET ORAL ONCE
Status: DISCONTINUED | OUTPATIENT
Start: 2025-07-07 | End: 2025-07-08 | Stop reason: HOSPADM

## 2025-07-07 RX ORDER — METOPROLOL TARTRATE 1 MG/ML
5 INJECTION, SOLUTION INTRAVENOUS
Status: DISCONTINUED | OUTPATIENT
Start: 2025-07-07 | End: 2025-07-08 | Stop reason: HOSPADM

## 2025-07-07 RX ORDER — METOPROLOL TARTRATE 50 MG
50 TABLET ORAL
Status: DISCONTINUED | OUTPATIENT
Start: 2025-07-07 | End: 2025-07-08 | Stop reason: HOSPADM

## 2025-07-07 RX ORDER — SODIUM CHLORIDE 9 MG/ML
40 INJECTION, SOLUTION INTRAVENOUS AS NEEDED
Status: DISCONTINUED | OUTPATIENT
Start: 2025-07-07 | End: 2025-07-08 | Stop reason: HOSPADM

## 2025-07-07 RX ORDER — METOPROLOL TARTRATE 50 MG
50 TABLET ORAL ONCE
Status: DISCONTINUED | OUTPATIENT
Start: 2025-07-07 | End: 2025-07-08 | Stop reason: HOSPADM

## 2025-07-07 RX ADMIN — IOPAMIDOL 65 ML: 755 INJECTION, SOLUTION INTRAVENOUS at 10:04

## 2025-07-07 RX ADMIN — NITROGLYCERIN 0.8 MG: 0.4 TABLET SUBLINGUAL at 09:51

## (undated) DEVICE — SYR LUERLOK 30CC

## (undated) DEVICE — GLV SURG PREMIERPRO MIC LTX PF SZ6.5 BRN

## (undated) DEVICE — JP PERF DRN SIL FLT 7MM FULL: Brand: CARDINAL HEALTH

## (undated) DEVICE — GLV SURG PREMIERPRO MIC LTX PF SZ7 BRN

## (undated) DEVICE — ANTIBACTERIAL UNDYED BRAIDED (POLYGLACTIN 910), SYNTHETIC ABSORBABLE SUTURE: Brand: COATED VICRYL

## (undated) DEVICE — LIMB HOLDER, WRIST/ANKLE: Brand: DEROYAL

## (undated) DEVICE — TRY L/P SFTY A/20G

## (undated) DEVICE — SYR CONTRL LUERLOK 10CC

## (undated) DEVICE — NDL HYPO ECLPS SFTY 25G 1 1/2IN

## (undated) DEVICE — TOOL 14MH30 LEGEND 14CM 3MM: Brand: MIDAS REX ™

## (undated) DEVICE — KITTNER SPONGE: Brand: DEROYAL

## (undated) DEVICE — TB SXN FRAZIER 12F STRL

## (undated) DEVICE — SKYLINE ANTERIOR CERVICAL PLATE SYSTEM DRILL 2.2 X 12MM: Brand: SKYLINE

## (undated) DEVICE — DISPOSABLE BIPOLAR FORCEPS 7 3/4" (19.7CM) SCOVILLE BAYONET, INSULATED, 1.5MM TIP AND 12 FT. (3.6M) CABLE: Brand: KIRWAN

## (undated) DEVICE — JACKSON-PRATT 100CC BULB RESERVOIR: Brand: CARDINAL HEALTH

## (undated) DEVICE — NDL HYPO ECLPS SFTY 18G 1 1/2IN

## (undated) DEVICE — SYR LL TP 10ML STRL

## (undated) DEVICE — 3M™ STERI-DRAPE™ INSTRUMENT POUCH 1018: Brand: STERI-DRAPE™

## (undated) DEVICE — SKIN AFFIX SURG ADHESIVE 72/CS 0.55ML: Brand: MEDLINE

## (undated) DEVICE — BNDG ADHS CURAD FLX/FABRC 1X3IN STRL LF

## (undated) DEVICE — 3M™ WARMING BLANKET, LOWER BODY, 10 PER CASE, 42568: Brand: BAIR HUGGER™

## (undated) DEVICE — STRAP POSTN KN/BDY FM 5X72IN DISP

## (undated) DEVICE — SNAP KOVER: Brand: UNBRANDED

## (undated) DEVICE — PIN DISTRACT 14MM SS STRL PK/2

## (undated) DEVICE — PK NEURO DISC 10

## (undated) DEVICE — SUT VICYL 2/0 CR8 18IN DYED J726D

## (undated) DEVICE — SYR LUERLOK 5CC

## (undated) DEVICE — ELECTRD BLD EDGE/INSUL1P 2.4X5.1MM STRL

## (undated) DEVICE — GLV SURG SENSICARE MICRO PF LF 8 STRL

## (undated) DEVICE — NEEDLE, QUINCKE, 20GX3.5": Brand: MEDLINE

## (undated) DEVICE — ELECTRD NDL EZ CLN MOD 2.75IN

## (undated) DEVICE — GLV SURG PREMIERPRO MIC LTX PF SZ8.5 BRN

## (undated) DEVICE — SPNG GZ WOVN 4X4IN 12PLY 10/BX STRL

## (undated) DEVICE — NDL BLNT 18G 1 1/2IN

## (undated) DEVICE — APPL CHG2PCT ALC70PCT 3ML ORNG

## (undated) DEVICE — ELECTRD BLD EXT EDGE/INSUL 1P 4IN

## (undated) DEVICE — NDL SPINE 22G 31/2IN BLK

## (undated) DEVICE — SUT SILK 2/0 TIES 18IN A185H

## (undated) DEVICE — GLV SURG BIOGEL LTX PF 8

## (undated) DEVICE — BANDAGE,GAUZE,BULKEE II,4.5"X4.1YD,STRL: Brand: MEDLINE

## (undated) DEVICE — APPL CHLORAPREP TINTED 26ML TEAL